# Patient Record
Sex: FEMALE | Race: WHITE | Employment: UNEMPLOYED | URBAN - NONMETROPOLITAN AREA
[De-identification: names, ages, dates, MRNs, and addresses within clinical notes are randomized per-mention and may not be internally consistent; named-entity substitution may affect disease eponyms.]

---

## 2020-09-08 ENCOUNTER — HOSPITAL ENCOUNTER (EMERGENCY)
Age: 40
Discharge: HOME | End: 2020-09-08
Attending: FAMILY MEDICINE
Payer: MEDICARE

## 2020-09-08 ENCOUNTER — APPOINTMENT (OUTPATIENT)
Dept: GENERAL RADIOLOGY | Age: 40
End: 2020-09-08
Payer: MEDICARE

## 2020-09-08 VITALS — WEIGHT: 233.6 LBS | TEMPERATURE: 97.9 F | HEART RATE: 98 BPM | RESPIRATION RATE: 18 BRPM | OXYGEN SATURATION: 96 %

## 2020-09-08 LAB
-: NORMAL
AMORPHOUS: NORMAL
BACTERIA: NORMAL
BILIRUBIN URINE: NEGATIVE
CASTS UA: NORMAL /LPF
COLOR: YELLOW
COMMENT UA: ABNORMAL
CRYSTALS, UA: NORMAL /HPF
EPITHELIAL CELLS UA: NORMAL /HPF
GLUCOSE URINE: NEGATIVE
HCG(URINE) PREGNANCY TEST: NEGATIVE
KETONES, URINE: ABNORMAL
LEUKOCYTE ESTERASE, URINE: NEGATIVE
MUCUS: NORMAL
NITRITE, URINE: NEGATIVE
OTHER OBSERVATIONS UA: NORMAL
PH UA: 5 (ref 5–8)
PROTEIN UA: ABNORMAL
RBC UA: NORMAL /HPF (ref 0–2)
RENAL EPITHELIAL, UA: NORMAL /HPF
SPECIFIC GRAVITY UA: 1.03 (ref 1–1.03)
TRICHOMONAS: NORMAL
TURBIDITY: CLEAR
URINE HGB: ABNORMAL
UROBILINOGEN, URINE: NORMAL
WBC UA: NORMAL /HPF
YEAST: NORMAL

## 2020-09-08 PROCEDURE — 71045 X-RAY EXAM CHEST 1 VIEW: CPT

## 2020-09-08 PROCEDURE — 81001 URINALYSIS AUTO W/SCOPE: CPT

## 2020-09-08 PROCEDURE — U0003 INFECTIOUS AGENT DETECTION BY NUCLEIC ACID (DNA OR RNA); SEVERE ACUTE RESPIRATORY SYNDROME CORONAVIRUS 2 (SARS-COV-2) (CORONAVIRUS DISEASE [COVID-19]), AMPLIFIED PROBE TECHNIQUE, MAKING USE OF HIGH THROUGHPUT TECHNOLOGIES AS DESCRIBED BY CMS-2020-01-R: HCPCS

## 2020-09-08 PROCEDURE — 99283 EMERGENCY DEPT VISIT LOW MDM: CPT

## 2020-09-08 PROCEDURE — 81025 URINE PREGNANCY TEST: CPT

## 2020-09-08 PROCEDURE — 6370000000 HC RX 637 (ALT 250 FOR IP): Performed by: FAMILY MEDICINE

## 2020-09-08 RX ORDER — IBUPROFEN 800 MG/1
800 TABLET ORAL EVERY 8 HOURS PRN
Qty: 30 TABLET | Refills: 1 | Status: SHIPPED | OUTPATIENT
Start: 2020-09-08 | End: 2020-10-25

## 2020-09-08 RX ORDER — AMOXICILLIN AND CLAVULANATE POTASSIUM 875; 125 MG/1; MG/1
1 TABLET, FILM COATED ORAL 2 TIMES DAILY
Qty: 20 TABLET | Refills: 0 | Status: SHIPPED | OUTPATIENT
Start: 2020-09-08 | End: 2020-09-18

## 2020-09-08 RX ORDER — IBUPROFEN 200 MG
200 TABLET ORAL EVERY 6 HOURS PRN
COMMUNITY
End: 2020-10-25 | Stop reason: ALTCHOICE

## 2020-09-08 RX ORDER — AMOXICILLIN AND CLAVULANATE POTASSIUM 875; 125 MG/1; MG/1
1 TABLET, FILM COATED ORAL ONCE
Status: COMPLETED | OUTPATIENT
Start: 2020-09-08 | End: 2020-09-08

## 2020-09-08 RX ORDER — GUAIFENESIN AND DEXTROMETHORPHAN HYDROBROMIDE 1200; 60 MG/1; MG/1
1 TABLET, EXTENDED RELEASE ORAL EVERY 12 HOURS PRN
Qty: 28 TABLET | Refills: 0 | Status: SHIPPED | OUTPATIENT
Start: 2020-09-08 | End: 2020-10-25 | Stop reason: ALTCHOICE

## 2020-09-08 RX ADMIN — AMOXICILLIN AND CLAVULANATE POTASSIUM 1 TABLET: 875; 125 TABLET, FILM COATED ORAL at 23:04

## 2020-09-08 ASSESSMENT — PAIN SCALES - GENERAL: PAINLEVEL_OUTOF10: 5

## 2020-09-09 ENCOUNTER — CARE COORDINATION (OUTPATIENT)
Dept: CARE COORDINATION | Age: 40
End: 2020-09-09

## 2020-09-09 ASSESSMENT — ENCOUNTER SYMPTOMS
COUGH: 1
FACIAL SWELLING: 1

## 2020-09-09 NOTE — CARE COORDINATION
Patient was called to follow up with most recent ER visit. There was no answer. The voicemail box is full.

## 2020-09-09 NOTE — ED PROVIDER NOTES
[address] [facility]  eMERGENCY dEPARTMENT eNCOUnter          [address] [facility]       Chief Complaint   Patient presents with    Fever     patients states having a fever and cough for 7 days    Dental Pain     dental pain for 2 days       Nurses Notes reviewed and I agree except as noted in the HPI. HISTORY OF PRESENT ILLNESS    Pito Hilton is a 37 y.o. female who presents to the emergency room via private vehicle, patient complaining of cough and fever for the past 7 days, some myalgias, also has noted dental pain for the past 2 weeks and much worse over the past couple days, initially on the left side of her face causing swelling the more recent is been in the right lower jaw. Patient rates her overall discomfort 5 out of 10. REVIEW OF SYSTEMS     Review of Systems   Constitutional: Positive for fever. HENT: Positive for dental problem and facial swelling. Respiratory: Positive for cough. All other systems reviewed and are negative. PAST MEDICAL HISTORY    has no past medical history on file. SURGICAL HISTORY      has no past surgical history on file. CURRENT MEDICATIONS       Discharge Medication List as of 9/8/2020 11:02 PM      CONTINUE these medications which have NOT CHANGED    Details   !! ibuprofen (ADVIL;MOTRIN) 200 MG tablet Take 200 mg by mouth every 6 hours as needed for PainHistorical Med       !! - Potential duplicate medications found. Please discuss with provider. ALLERGIES     has No Known Allergies. FAMILY HISTORY     has no family status information on file. family history is not on file. SOCIAL HISTORY      reports that she has been smoking cigarettes. She has been smoking about 2.00 packs per day. She has never used smokeless tobacco.    PHYSICAL EXAM     INITIAL VITALS:  weight is 233 lb 9.6 oz (106 kg). Her oral temperature is 97.9 °F (36.6 °C). Her pulse is 98. Her respiration is 18 and oxygen saturation is 96%. Physical Exam   Constitutional: Patient is oriented to person, place, and time. Patient appears well-developed and well-nourished. Patient is active and cooperative. HENT:   Head: Normocephalic and atraumatic. Head is without contusion. Right Ear: Hearing and external ear normal. No drainage. Left Ear: Hearing and external ear normal. No drainage. Nose: Nose normal. No nasal deformity. No epistaxis. Mouth/Throat: Mucous membranes are not dry. There is numerous decaying teeth, including a cavitated tooth in the right lower jaw, there is no gross abscess, there is subtle swelling greater on the right aspect of the face  Eyes: EOMI. Conjunctivae, sclera, and lids are normal. Right eye exhibits no discharge. Left eye exhibits no discharge. Neck: Full passive range of motion without pain and phonation normal.   Cardiovascular:  Normal rate, regular rhythm and intact distal pulses. Pulses: Right radial pulse  2+   Pulmonary/Chest: Effort normal. No tachypnea and no bradypnea. Fine central coarseness without wheezing rhonchi or rales  Abdominal: Increased BMI  Musculoskeletal:   Negative acute trauma or deformity,  apparent full range of motion and normal strength all extremities appropriate to age. Neurological: Patient is alert and oriented to person, place, and time. patient displays no tremor. Patient displays no seizure activity. .  Lymphatic: No cervical lymphadenopathy  Skin: Skin is warm and dry. Patient is not diaphoretic. Psychiatric: Patient has a normal mood and affect. Patient speech is normal and behavior is normal. Cognition and memory are normal.    DIFFERENTIAL DIAGNOSIS:   Pneumonia bronchitis URI, viral illness NOS, dental infection    DIAGNOSTIC RESULTS           RADIOLOGY: non-plain film images(s) such as CT, Ultrasound and MRI are read by the radiologist.  XR CHEST PORTABLE   Final Result      No focal consolidation, pneumothorax or pleural effusion.       Bilateral peribronchial thickening is noted as can be seen with    bronchitis/bronchiolitis, viral pneumonitis or reactive airways disease. LABS:   Labs Reviewed   URINALYSIS - Abnormal; Notable for the following components:       Result Value    Ketones, Urine TRACE (*)     Urine Hgb TRACE (*)     Protein, UA 1+ (*)     All other components within normal limits   PREGNANCY, URINE   MICROSCOPIC URINALYSIS   COVID-19       EMERGENCY DEPARTMENT COURSE:   Vitals:    Vitals:    09/08/20 2150   Pulse: 98   Resp: 18   Temp: 97.9 °F (36.6 °C)   TempSrc: Oral   SpO2: 96%   Weight: 233 lb 9.6 oz (106 kg)     Patient history and physical exam taken at bedside, discussed patient symptoms and exam findings, discussed initial work-up to include chest x-ray, oral swab, patient later states she has concern for possible pregnancy and urinary tract infection, urine studies. Explained to patient at this time would like to hold on blood work until imaging reviewed, acknowledged. Patient sitting in bed semi-Fowlers    Imaging reviewed    Urine studies reviewed    Discussed with patient her overall work-up, discussed diagnosis bronchitis, explained this is most often viral disease runs its course though OTC cough cold medication such as Mucinex DM may give her some relief, discussed patient's dental cavities likely causing possible infection facial swelling, confirmed allergies, will initiate patient on Augmentin twice daily for 10 days with first dose in the emergency room, Motrin/Tylenol for pain, need for follow-up with dentist/oral surgeon, patient knowledges    FINAL IMPRESSION      1. Bronchitis    2. Facial swelling    3.  Pain, dental          DISPOSITION/PLAN   D/c    PATIENT REFERRED TO:  Follow-up with dentist/oral surgeon    Schedule an appointment as soon as possible for a visit       Keith   [address] [facility]  [phone]  Call         DISCHARGE MEDICATIONS:  Discharge Medication List as of 9/8/2020 11:02 PM      START taking these medications    Details   amoxicillin-clavulanate (AUGMENTIN) 875-125 MG per tablet Take 1 tablet by mouth 2 times daily for 10 days, Disp-20 tablet,R-0Print      !! ibuprofen (ADVIL;MOTRIN) 800 MG tablet Take 1 tablet by mouth every 8 hours as needed for Pain, Disp-30 tablet,R-1Print      Dextromethorphan-guaiFENesin  MG TB12 Take 1 tablet by mouth every 12 hours as needed (cough, congestion), Disp-28 tablet,R-0Print       !! - Potential duplicate medications found. Please discuss with provider. Summation      Patient Course:  D/c    ED Medications administered this visit:    Medications   amoxicillin-clavulanate (AUGMENTIN) 875-125 MG per tablet 1 tablet (1 tablet Oral Given 9/8/20 2304)       New Prescriptions from this visit:    Discharge Medication List as of 9/8/2020 11:02 PM      START taking these medications    Details   amoxicillin-clavulanate (AUGMENTIN) 875-125 MG per tablet Take 1 tablet by mouth 2 times daily for 10 days, Disp-20 tablet,R-0Print      !! ibuprofen (ADVIL;MOTRIN) 800 MG tablet Take 1 tablet by mouth every 8 hours as needed for Pain, Disp-30 tablet,R-1Print      Dextromethorphan-guaiFENesin  MG TB12 Take 1 tablet by mouth every 12 hours as needed (cough, congestion), Disp-28 tablet,R-0Print       !! - Potential duplicate medications found. Please discuss with provider. Follow-up:  Follow-up with dentist/oral surgeon    Schedule an appointment as soon as possible for a visit       Keith [address]  [address] [facility]  Call           Final Impression:   1. Bronchitis    2. Facial swelling    3.  Pain, dental               (Please note that portions of this note were completed with a voice recognition program.  Efforts were made to edit the dictations but occasionally words are mis-transcribed.)    MD Shane Means MD  09/09/20 7706

## 2020-09-11 LAB — SARS-COV-2, NAA: NOT DETECTED

## 2020-10-25 ENCOUNTER — HOSPITAL ENCOUNTER (EMERGENCY)
Age: 40
Discharge: HOME | End: 2020-10-25
Attending: FAMILY MEDICINE
Payer: MEDICARE

## 2020-10-25 ENCOUNTER — APPOINTMENT (OUTPATIENT)
Dept: GENERAL RADIOLOGY | Age: 40
End: 2020-10-25
Payer: MEDICARE

## 2020-10-25 VITALS
DIASTOLIC BLOOD PRESSURE: 78 MMHG | HEART RATE: 97 BPM | WEIGHT: 241.9 LBS | OXYGEN SATURATION: 95 % | RESPIRATION RATE: 18 BRPM | BODY MASS INDEX: 38.87 KG/M2 | SYSTOLIC BLOOD PRESSURE: 123 MMHG | HEIGHT: 66 IN | TEMPERATURE: 98.9 F

## 2020-10-25 LAB
ABSOLUTE EOS #: 0.1 K/UL (ref 0–0.4)
ABSOLUTE IMMATURE GRANULOCYTE: ABNORMAL K/UL (ref 0–0.3)
ABSOLUTE LYMPH #: 2 K/UL (ref 1–4.8)
ABSOLUTE MONO #: 0.8 K/UL (ref 0–1)
ANION GAP SERPL CALCULATED.3IONS-SCNC: 7 MMOL/L (ref 9–17)
BASOPHILS # BLD: 0 % (ref 0–2)
BASOPHILS ABSOLUTE: 0 K/UL (ref 0–0.2)
BUN BLDV-MCNC: 12 MG/DL (ref 6–20)
BUN/CREAT BLD: 24 (ref 9–20)
CALCIUM SERPL-MCNC: 9 MG/DL (ref 8.6–10.4)
CHLORIDE BLD-SCNC: 102 MMOL/L (ref 98–107)
CO2: 28 MMOL/L (ref 20–31)
CREAT SERPL-MCNC: 0.51 MG/DL (ref 0.5–0.9)
D-DIMER QUANTITATIVE: 0.35 MG/L FEU (ref 0–0.59)
DIFFERENTIAL TYPE: YES
EOSINOPHILS RELATIVE PERCENT: 1 % (ref 0–5)
GFR AFRICAN AMERICAN: >60 ML/MIN
GFR NON-AFRICAN AMERICAN: >60 ML/MIN
GFR SERPL CREATININE-BSD FRML MDRD: ABNORMAL ML/MIN/{1.73_M2}
GFR SERPL CREATININE-BSD FRML MDRD: ABNORMAL ML/MIN/{1.73_M2}
GLUCOSE BLD-MCNC: 143 MG/DL (ref 70–99)
HCG QUALITATIVE: NEGATIVE
HCT VFR BLD CALC: 39.5 % (ref 36–46)
HEMOGLOBIN: 13.1 G/DL (ref 12–16)
IMMATURE GRANULOCYTES: ABNORMAL %
LYMPHOCYTES # BLD: 15 % (ref 15–40)
MCH RBC QN AUTO: 30.7 PG (ref 26–34)
MCHC RBC AUTO-ENTMCNC: 33.2 G/DL (ref 31–37)
MCV RBC AUTO: 92.6 FL (ref 80–100)
MONOCYTES # BLD: 6 % (ref 4–8)
NRBC AUTOMATED: ABNORMAL PER 100 WBC
PDW BLD-RTO: 14.7 % (ref 12.1–15.2)
PLATELET # BLD: 238 K/UL (ref 140–450)
PLATELET ESTIMATE: ABNORMAL
PMV BLD AUTO: ABNORMAL FL (ref 6–12)
POTASSIUM SERPL-SCNC: 3.8 MMOL/L (ref 3.7–5.3)
RBC # BLD: 4.27 M/UL (ref 4–5.2)
RBC # BLD: ABNORMAL 10*6/UL
SARS-COV-2, RAPID: NOT DETECTED
SARS-COV-2: NORMAL
SARS-COV-2: NORMAL
SEG NEUTROPHILS: 78 % (ref 47–75)
SEGMENTED NEUTROPHILS ABSOLUTE COUNT: 10.3 K/UL (ref 2.5–7)
SODIUM BLD-SCNC: 137 MMOL/L (ref 135–144)
SOURCE: NORMAL
TROPONIN INTERP: NORMAL
TROPONIN INTERP: NORMAL
TROPONIN T: <0.03 NG/ML
TROPONIN T: <0.03 NG/ML
TROPONIN, HIGH SENSITIVITY: NORMAL NG/L (ref 0–14)
TROPONIN, HIGH SENSITIVITY: NORMAL NG/L (ref 0–14)
WBC # BLD: 13.3 K/UL (ref 3.5–11)
WBC # BLD: ABNORMAL 10*3/UL

## 2020-10-25 PROCEDURE — 6370000000 HC RX 637 (ALT 250 FOR IP): Performed by: FAMILY MEDICINE

## 2020-10-25 PROCEDURE — 36415 COLL VENOUS BLD VENIPUNCTURE: CPT

## 2020-10-25 PROCEDURE — 85379 FIBRIN DEGRADATION QUANT: CPT

## 2020-10-25 PROCEDURE — 84703 CHORIONIC GONADOTROPIN ASSAY: CPT

## 2020-10-25 PROCEDURE — 73630 X-RAY EXAM OF FOOT: CPT

## 2020-10-25 PROCEDURE — U0002 COVID-19 LAB TEST NON-CDC: HCPCS

## 2020-10-25 PROCEDURE — 93005 ELECTROCARDIOGRAM TRACING: CPT | Performed by: FAMILY MEDICINE

## 2020-10-25 PROCEDURE — 71045 X-RAY EXAM CHEST 1 VIEW: CPT

## 2020-10-25 PROCEDURE — 85025 COMPLETE CBC W/AUTO DIFF WBC: CPT

## 2020-10-25 PROCEDURE — 84484 ASSAY OF TROPONIN QUANT: CPT

## 2020-10-25 PROCEDURE — 80048 BASIC METABOLIC PNL TOTAL CA: CPT

## 2020-10-25 PROCEDURE — 99284 EMERGENCY DEPT VISIT MOD MDM: CPT

## 2020-10-25 RX ORDER — DOXYCYCLINE HYCLATE 100 MG
100 TABLET ORAL ONCE
Status: COMPLETED | OUTPATIENT
Start: 2020-10-25 | End: 2020-10-25

## 2020-10-25 RX ORDER — ASPIRIN 81 MG/1
324 TABLET, CHEWABLE ORAL ONCE
Status: COMPLETED | OUTPATIENT
Start: 2020-10-25 | End: 2020-10-25

## 2020-10-25 RX ORDER — DOXYCYCLINE HYCLATE 100 MG
100 TABLET ORAL 2 TIMES DAILY
Qty: 20 TABLET | Refills: 0 | Status: SHIPPED | OUTPATIENT
Start: 2020-10-25 | End: 2020-10-27

## 2020-10-25 RX ADMIN — ASPIRIN 324 MG: 81 TABLET, CHEWABLE ORAL at 18:48

## 2020-10-25 RX ADMIN — DOXYCYCLINE HYCLATE 100 MG: 100 TABLET, COATED ORAL at 21:40

## 2020-10-25 ASSESSMENT — PAIN DESCRIPTION - LOCATION: LOCATION: CHEST

## 2020-10-25 ASSESSMENT — PAIN SCALES - GENERAL: PAINLEVEL_OUTOF10: 6

## 2020-10-25 ASSESSMENT — PAIN DESCRIPTION - PAIN TYPE: TYPE: ACUTE PAIN

## 2020-10-26 LAB
EKG ATRIAL RATE: 108 BPM
EKG ATRIAL RATE: 99 BPM
EKG P AXIS: 49 DEGREES
EKG P AXIS: 69 DEGREES
EKG P-R INTERVAL: 150 MS
EKG P-R INTERVAL: 158 MS
EKG Q-T INTERVAL: 350 MS
EKG Q-T INTERVAL: 364 MS
EKG QRS DURATION: 80 MS
EKG QRS DURATION: 84 MS
EKG QTC CALCULATION (BAZETT): 467 MS
EKG QTC CALCULATION (BAZETT): 469 MS
EKG R AXIS: 39 DEGREES
EKG R AXIS: 63 DEGREES
EKG T AXIS: 33 DEGREES
EKG T AXIS: 60 DEGREES
EKG VENTRICULAR RATE: 108 BPM
EKG VENTRICULAR RATE: 99 BPM

## 2020-10-26 PROCEDURE — 93010 ELECTROCARDIOGRAM REPORT: CPT | Performed by: INTERNAL MEDICINE

## 2020-10-26 ASSESSMENT — ENCOUNTER SYMPTOMS
SHORTNESS OF BREATH: 1
COUGH: 1

## 2020-10-26 ASSESSMENT — HEART SCORE: ECG: 0

## 2020-10-26 NOTE — ED PROVIDER NOTES
[address] [facility]  eMERGENCY dEPARTMENT eNCOUnter          [address] [facility]       Chief Complaint   Patient presents with    Chest Pain     Pt had severe chest pain yesterday and is extremely SOB with exertion     Cough    Shortness of Breath       Nurses Notes reviewed and I agree except as noted in the HPI. HISTORY OF PRESENT ILLNESS    Marya Tovar is a 37 y.o. female who presents to the emergency room via private vehicle, patient complaining of 1 day of \"really bad chest pain\", and feels that she could not breathe, states she has had fever cough and shortness of breath. Patient states she was told she had an abnormal heart work-up at [facility] however did not complete her follow-up at that facility regarding this. Patient dates she did some Tylenol 3 hours prior to arrival.  Patient also complained of some right foot pain, states she is a history of 2 surgeries in the foot and did have a broken bone once without even knowing it, was concerned for another break, denies any trauma or falls. Patient rates her overall discomfort 6/10, indicating her chest.  Patient denies any history of blood clots or known family history of blood clots, patient is a smoker. REVIEW OF SYSTEMS     Review of Systems   Constitutional: Positive for fever. Respiratory: Positive for cough and shortness of breath. All other systems reviewed and are negative. PAST MEDICAL HISTORY    has a past medical history of Endometriosis. SURGICAL HISTORY      has a past surgical history that includes Foot surgery (Right);  section; and Dilation and curettage of uterus. CURRENT MEDICATIONS       Discharge Medication List as of 10/25/2020  9:33 PM          ALLERGIES     has No Known Allergies. FAMILY HISTORY     has no family status information on file. family history is not on file. SOCIAL HISTORY      reports that she has been smoking cigarettes.  She has been smoking about 2.00 packs per day. She has never used smokeless tobacco. She reports previous alcohol use. She reports previous drug use. PHYSICAL EXAM     INITIAL VITALS:  height is 5' 6\" (1.676 m) and weight is 241 lb 14.4 oz (109.7 kg). Her oral temperature is 98.9 °F (37.2 °C). Her blood pressure is 123/78 and her pulse is 97. Her respiration is 18 and oxygen saturation is 95%. Physical Exam   Constitutional: Patient is oriented to person, place, and time. Patient appears well-developed and well-nourished. Patient is active and cooperative. HENT:   Head: Normocephalic and atraumatic. Head is without contusion. Right Ear: Hearing and external ear normal. No drainage. Left Ear: Hearing and external ear normal. No drainage. Nose: Nose normal. No nasal deformity. No epistaxis. Mouth/Throat: Mucous membranes are not dry. Eyes: EOMI. Conjunctivae, sclera, and lids are normal. Right eye exhibits no discharge. Left eye exhibits no discharge. Neck: Full passive range of motion without pain and phonation normal.   Cardiovascular:  Normal rate, regular rhythm and intact distal pulses. No gross lower extremity edema. Pulses: Right radial pulse  2+   Pulmonary/Chest: Effort normal. No tachypnea and no bradypnea. No wheezes, rhonchi, or rales. Abdominal: BMI 39, soft, nondistended  Musculoskeletal:   Focused examination of patient's right foot shows a well-healed surgical scar over the medial aspect, there is no acute trauma or deformity otherwise appreciated, no overlying integument aberration otherwise noted. Except as otherwise noted, negative acute trauma or deformity,  apparent full range of motion and normal strength all extremities appropriate to age. Neurological: Patient is alert and oriented to person, place, and time. patient displays no tremor. Patient displays no seizure activity. .    Skin: Skin is warm and dry. Patient is not diaphoretic. Psychiatric: Patient has a normal mood and affect.  Patient speech is normal and behavior is normal. Cognition and memory are normal.    DIFFERENTIAL DIAGNOSIS:   PNA bronchitis URI, viral illness NOS, PE, fracture dislocation    DIAGNOSTIC RESULTS     EKG: All EKG's are interpreted by the Emergency Department Physician who either signs or Co-signs this chart in the absence of a cardiologist.  EKG    The patient had an EKG which is interpreted by me in the absence of a Cardiologist.   [x] Without comparison to previous. [] With comparison to a previous EKG Dated     EKG @ 1849 hrs -sinus rhythm rate 108, normal axis normal intervals, no prior for comparison    EKG @ 2058 hrs -sinus rhythm rate 99 normal axis normal intervals    RADIOLOGY: non-plain film images(s) such as CT, Ultrasound and MRI are read by the radiologist.  XR CHEST PORTABLE   Final Result      Bibasilar atelectasis or subtle infiltrate with bronchial wall thickening,    right greater than left. No consolidation or effusion. No pneumothorax. Mild cardiomegaly is again suggested. XR FOOT RIGHT (MIN 3 VIEWS)   Final Result      No acute osseous variation is seen.       2                LABS:   Labs Reviewed   CBC WITH AUTO DIFFERENTIAL - Abnormal; Notable for the following components:       Result Value    WBC 13.3 (*)     Seg Neutrophils 78 (*)     Segs Absolute 10.30 (*)     All other components within normal limits   BASIC METABOLIC PANEL W/ REFLEX TO MG FOR LOW K - Abnormal; Notable for the following components:    Glucose 143 (*)     Bun/Cre Ratio 24 (*)     Anion Gap 7 (*)     All other components within normal limits   TROPONIN   D-DIMER, QUANTITATIVE   HCG, SERUM, QUALITATIVE   COVID-19   TROPONIN       EMERGENCY DEPARTMENT COURSE:   Vitals:    Vitals:    10/25/20 1835 10/25/20 1838 10/25/20 2126   BP: (!) 159/81 (!) 159/81 123/78   Pulse: 121  97   Resp: 18  18   Temp: 98.9 °F (37.2 °C)     TempSrc: Oral     SpO2: 99% 97% 95%   Weight: 241 lb 14.4 oz (109.7 kg)     Height: 5' 6\" (1.676 m) Patient history and physical exam taken at bedside, discussed patient symptoms and exam findings, discussed initial plan work-up to include EKG, chest x-ray, blood work, IV access, chewable aspirin. Patient resting bed semi-Fowlers, acknowledges    Patient complaining of right foot pain, x-ray right foot added. EKG and chest x-ray as above    Initial lab work-up reviewed    Initial order for outpatient Covid swab was sent, however it was done as a rapid test, Covid negative    Discussed with patient my concern for possible early pneumonia, however with patient's complaint of chest pain and some risk factors I would like to get a 2-hour troponin and reevaluate, patient resting high semi-Fowlers in bed, acknowledges    Second troponin negative, second EKG as above    Discussed with patient overall work-up, confirmed allergies, will initiate patient on doxycycline with first dose in the emergency room and prescription same, Motrin/Tylenol for any fevers or aches, will refer to primary care for follow-up, return to ER if any symptoms change worsen or other concerns, patient acknowledges    Strongly encourage patient to consider is to stop smoking    FINAL IMPRESSION      1.  Pneumonia due to organism          DISPOSITION/PLAN   D/c    PATIENT REFERRED TO:  Keith   [address] [place] [address]          DISCHARGE MEDICATIONS:  Discharge Medication List as of 10/25/2020  9:33 PM      START taking these medications    Details   doxycycline hyclate (VIBRA-TABS) 100 MG tablet Take 1 tablet by mouth 2 times daily for 10 days, Disp-20 tablet,R-0Normal                 Summation      Patient Course:  D/c    ED Medications administered this visit:    Medications   aspirin chewable tablet 324 mg (324 mg Oral Given 10/25/20 8773)   doxycycline hyclate (VIBRA-TABS) tablet 100 mg (100 mg Oral Given 10/25/20 2140)       New Prescriptions from this visit:    Discharge Medication List as of 10/25/2020  9:33 PM      START taking these medications    Details   doxycycline hyclate (VIBRA-TABS) 100 MG tablet Take 1 tablet by mouth 2 times daily for 10 days, Disp-20 tablet,R-0Normal             Follow-up:  Keith 59  [address] [facility]            Final Impression:   1.  Pneumonia due to organism               (Please note that portions of this note were completed with a voice recognition program.  Efforts were made to edit the dictations but occasionally words are mis-transcribed.)    MD Elkin Ledezma MD  10/26/20 0748

## 2020-10-27 ENCOUNTER — HOSPITAL ENCOUNTER (EMERGENCY)
Age: 40
Discharge: HOME | End: 2020-10-27
Attending: FAMILY MEDICINE
Payer: MEDICARE

## 2020-10-27 ENCOUNTER — APPOINTMENT (OUTPATIENT)
Dept: GENERAL RADIOLOGY | Age: 40
End: 2020-10-27
Payer: MEDICARE

## 2020-10-27 VITALS
OXYGEN SATURATION: 97 % | DIASTOLIC BLOOD PRESSURE: 86 MMHG | WEIGHT: 240 LBS | HEIGHT: 66 IN | SYSTOLIC BLOOD PRESSURE: 120 MMHG | HEART RATE: 102 BPM | TEMPERATURE: 98.2 F | BODY MASS INDEX: 38.57 KG/M2 | RESPIRATION RATE: 16 BRPM

## 2020-10-27 LAB
ABSOLUTE EOS #: 0.1 K/UL (ref 0–0.4)
ABSOLUTE IMMATURE GRANULOCYTE: ABNORMAL K/UL (ref 0–0.3)
ABSOLUTE LYMPH #: 1.5 K/UL (ref 1–4.8)
ABSOLUTE MONO #: 0.7 K/UL (ref 0–1)
ANION GAP SERPL CALCULATED.3IONS-SCNC: 8 MMOL/L (ref 9–17)
BASOPHILS # BLD: 0 % (ref 0–2)
BASOPHILS ABSOLUTE: 0 K/UL (ref 0–0.2)
BUN BLDV-MCNC: 10 MG/DL (ref 6–20)
BUN/CREAT BLD: 21 (ref 9–20)
CALCIUM SERPL-MCNC: 8.7 MG/DL (ref 8.6–10.4)
CHLORIDE BLD-SCNC: 103 MMOL/L (ref 98–107)
CO2: 26 MMOL/L (ref 20–31)
CREAT SERPL-MCNC: 0.47 MG/DL (ref 0.5–0.9)
D-DIMER QUANTITATIVE: 0.33 MG/L FEU (ref 0–0.59)
DIFFERENTIAL TYPE: YES
EOSINOPHILS RELATIVE PERCENT: 1 % (ref 0–5)
GFR AFRICAN AMERICAN: >60 ML/MIN
GFR NON-AFRICAN AMERICAN: >60 ML/MIN
GFR SERPL CREATININE-BSD FRML MDRD: ABNORMAL ML/MIN/{1.73_M2}
GFR SERPL CREATININE-BSD FRML MDRD: ABNORMAL ML/MIN/{1.73_M2}
GLUCOSE BLD-MCNC: 143 MG/DL (ref 70–99)
HCT VFR BLD CALC: 37.6 % (ref 36–46)
HEMOGLOBIN: 12.6 G/DL (ref 12–16)
IMMATURE GRANULOCYTES: ABNORMAL %
LYMPHOCYTES # BLD: 19 % (ref 15–40)
MCH RBC QN AUTO: 30.8 PG (ref 26–34)
MCHC RBC AUTO-ENTMCNC: 33.5 G/DL (ref 31–37)
MCV RBC AUTO: 91.9 FL (ref 80–100)
MONOCYTES # BLD: 9 % (ref 4–8)
NRBC AUTOMATED: ABNORMAL PER 100 WBC
PDW BLD-RTO: 14.4 % (ref 12.1–15.2)
PLATELET # BLD: 209 K/UL (ref 140–450)
PLATELET ESTIMATE: ABNORMAL
PMV BLD AUTO: ABNORMAL FL (ref 6–12)
POTASSIUM SERPL-SCNC: 3.8 MMOL/L (ref 3.7–5.3)
RBC # BLD: 4.09 M/UL (ref 4–5.2)
RBC # BLD: ABNORMAL 10*6/UL
SEG NEUTROPHILS: 71 % (ref 47–75)
SEGMENTED NEUTROPHILS ABSOLUTE COUNT: 5.9 K/UL (ref 2.5–7)
SODIUM BLD-SCNC: 137 MMOL/L (ref 135–144)
WBC # BLD: 8.4 K/UL (ref 3.5–11)
WBC # BLD: ABNORMAL 10*3/UL

## 2020-10-27 PROCEDURE — 99285 EMERGENCY DEPT VISIT HI MDM: CPT

## 2020-10-27 PROCEDURE — 85025 COMPLETE CBC W/AUTO DIFF WBC: CPT

## 2020-10-27 PROCEDURE — 94664 DEMO&/EVAL PT USE INHALER: CPT

## 2020-10-27 PROCEDURE — 80048 BASIC METABOLIC PNL TOTAL CA: CPT

## 2020-10-27 PROCEDURE — 96375 TX/PRO/DX INJ NEW DRUG ADDON: CPT

## 2020-10-27 PROCEDURE — 96374 THER/PROPH/DIAG INJ IV PUSH: CPT

## 2020-10-27 PROCEDURE — 6370000000 HC RX 637 (ALT 250 FOR IP): Performed by: FAMILY MEDICINE

## 2020-10-27 PROCEDURE — 6360000002 HC RX W HCPCS: Performed by: FAMILY MEDICINE

## 2020-10-27 PROCEDURE — 71045 X-RAY EXAM CHEST 1 VIEW: CPT

## 2020-10-27 PROCEDURE — 93005 ELECTROCARDIOGRAM TRACING: CPT | Performed by: FAMILY MEDICINE

## 2020-10-27 PROCEDURE — 85379 FIBRIN DEGRADATION QUANT: CPT

## 2020-10-27 RX ORDER — IPRATROPIUM BROMIDE AND ALBUTEROL SULFATE 2.5; .5 MG/3ML; MG/3ML
1 SOLUTION RESPIRATORY (INHALATION) ONCE
Status: COMPLETED | OUTPATIENT
Start: 2020-10-27 | End: 2020-10-27

## 2020-10-27 RX ORDER — KETOROLAC TROMETHAMINE 30 MG/ML
30 INJECTION, SOLUTION INTRAMUSCULAR; INTRAVENOUS ONCE
Status: COMPLETED | OUTPATIENT
Start: 2020-10-27 | End: 2020-10-27

## 2020-10-27 RX ORDER — ALBUTEROL SULFATE 90 UG/1
2 AEROSOL, METERED RESPIRATORY (INHALATION) EVERY 6 HOURS PRN
Qty: 1 INHALER | Refills: 0 | Status: SHIPPED | OUTPATIENT
Start: 2020-10-27

## 2020-10-27 RX ORDER — DOXYCYCLINE HYCLATE 100 MG
100 TABLET ORAL 2 TIMES DAILY
Qty: 20 TABLET | Refills: 0 | Status: SHIPPED | OUTPATIENT
Start: 2020-10-27 | End: 2020-11-06

## 2020-10-27 RX ORDER — ALBUTEROL SULFATE 90 UG/1
2 AEROSOL, METERED RESPIRATORY (INHALATION) EVERY 6 HOURS PRN
Status: DISCONTINUED | OUTPATIENT
Start: 2020-10-27 | End: 2020-10-27 | Stop reason: HOSPADM

## 2020-10-27 RX ORDER — ALBUTEROL SULFATE 90 UG/1
AEROSOL, METERED RESPIRATORY (INHALATION)
Status: DISCONTINUED
Start: 2020-10-27 | End: 2020-10-27 | Stop reason: HOSPADM

## 2020-10-27 RX ORDER — METHYLPREDNISOLONE SODIUM SUCCINATE 125 MG/2ML
125 INJECTION, POWDER, LYOPHILIZED, FOR SOLUTION INTRAMUSCULAR; INTRAVENOUS ONCE
Status: COMPLETED | OUTPATIENT
Start: 2020-10-27 | End: 2020-10-27

## 2020-10-27 RX ORDER — PREDNISONE 20 MG/1
60 TABLET ORAL DAILY
Qty: 15 TABLET | Refills: 0 | Status: SHIPPED | OUTPATIENT
Start: 2020-10-27 | End: 2020-11-01

## 2020-10-27 RX ORDER — DOXYCYCLINE HYCLATE 100 MG
100 TABLET ORAL ONCE
Status: COMPLETED | OUTPATIENT
Start: 2020-10-27 | End: 2020-10-27

## 2020-10-27 RX ADMIN — IPRATROPIUM BROMIDE AND ALBUTEROL SULFATE 1 AMPULE: .5; 3 SOLUTION RESPIRATORY (INHALATION) at 20:29

## 2020-10-27 RX ADMIN — METHYLPREDNISOLONE SODIUM SUCCINATE 125 MG: 125 INJECTION, POWDER, FOR SOLUTION INTRAMUSCULAR; INTRAVENOUS at 20:12

## 2020-10-27 RX ADMIN — KETOROLAC TROMETHAMINE 30 MG: 30 INJECTION, SOLUTION INTRAMUSCULAR; INTRAVENOUS at 20:45

## 2020-10-27 RX ADMIN — DOXYCYCLINE HYCLATE 100 MG: 100 TABLET, COATED ORAL at 21:14

## 2020-10-27 ASSESSMENT — PAIN DESCRIPTION - FREQUENCY: FREQUENCY: CONTINUOUS

## 2020-10-27 ASSESSMENT — PAIN DESCRIPTION - ORIENTATION: ORIENTATION: RIGHT

## 2020-10-27 ASSESSMENT — PAIN DESCRIPTION - PAIN TYPE: TYPE: ACUTE PAIN

## 2020-10-27 ASSESSMENT — PAIN SCALES - GENERAL
PAINLEVEL_OUTOF10: 6
PAINLEVEL_OUTOF10: 7
PAINLEVEL_OUTOF10: 7

## 2020-10-27 ASSESSMENT — PAIN DESCRIPTION - DESCRIPTORS: DESCRIPTORS: TIGHTNESS

## 2020-10-27 ASSESSMENT — PAIN DESCRIPTION - LOCATION: LOCATION: CHEST

## 2020-10-28 LAB
EKG ATRIAL RATE: 92 BPM
EKG P AXIS: 45 DEGREES
EKG P-R INTERVAL: 162 MS
EKG Q-T INTERVAL: 376 MS
EKG QRS DURATION: 82 MS
EKG QTC CALCULATION (BAZETT): 464 MS
EKG R AXIS: 22 DEGREES
EKG T AXIS: 26 DEGREES
EKG VENTRICULAR RATE: 92 BPM

## 2020-10-28 PROCEDURE — 93010 ELECTROCARDIOGRAM REPORT: CPT | Performed by: INTERNAL MEDICINE

## 2020-10-28 ASSESSMENT — ENCOUNTER SYMPTOMS
VOMITING: 0
CHEST TIGHTNESS: 1
SHORTNESS OF BREATH: 1

## 2020-10-28 NOTE — ED NOTES
Writer gives pt her paperwork and gives her the phone number for  so she can contact them for help with her prescriptions.      Enrique Garnica RN  10/27/20 9121

## 2020-10-28 NOTE — ED PROVIDER NOTES
[address] [facility]  eMERGENCY dEPARTMENT eNCOUnter          [address] [facility]       Chief Complaint   Patient presents with    Respiratory Distress     Patient arrives to ER tonight by [facility] with complaints of difficulty breathing. Patient was seen here yesterday and was diagnosed with pneumonia but was unable to get her script filled. Nurses Notes reviewed and I agree except as noted in the HPI. HISTORY OF PRESENT ILLNESS    Maria Alejandra Roblero is a 37 y.o. female who presents to the emergency room via EMS from home with complaints of shortness of breath/difficulty breathing, patient states was diagnosed with pneumonia 2 days ago, but states she was unable to get her prescription filled due to cost, I will not bill for them for a few days. Patient states she is in route to the gas station on foot when she fell she has become too short of breath so called for EMS. Patient is a smoker, states 6 months ago she was tested for potential COPD but never followed up with pulmonology for her results. Patient denies any cardiac history or known history of blood clots, or family history same. Patient rates her chest discomfort 7-10, point at the right side of her chest, describing a tight continuous sensation. REVIEW OF SYSTEMS     Review of Systems   Constitutional: Negative for fever. Respiratory: Positive for chest tightness and shortness of breath. Gastrointestinal: Negative for vomiting. All other systems reviewed and are negative. PAST MEDICAL HISTORY    has a past medical history of Endometriosis and Pneumonia. SURGICAL HISTORY      has a past surgical history that includes Foot surgery (Right);  section; and Dilation and curettage of uterus. CURRENT MEDICATIONS       Discharge Medication List as of 10/27/2020  9:11 PM          ALLERGIES     has No Known Allergies. FAMILY HISTORY     has no family status information on file.     family history is not on semi-Fowlers in bed. RN Christopher Man advises that patient has told her that she has not yet started her antibiotics    EKG as above    Initial lab work-up reviewed    Patient is requesting something for pain for her back, allergies reviewed, ketorolac 30 mg IV ordered    Discussed with patient overall work-up, discussed again concern for pneumonia, as well as patient's potential for having underlying COPD due to her smoking, discussed importance of antibiotic as prescribed, will give dose the emergency room, and will try to get patient under [facility] care for remaining medications. Is there is some question of underlying respiratory illness that is chronic, will give patient 5-day course of steroids and will try to send home with albuterol inhaler from the emergency room with prescription same. I would strongly advise patient to stop smoking, will refer to primary care for follow-up, return to ER if symptoms change worse other concerns, patient acknowledges    FINAL IMPRESSION      1.  Pneumonia due to infectious organism, unspecified laterality, unspecified part of lung          DISPOSITION/PLAN   Discharge    PATIENT REFERRED TO:  Keith 59  [address] [facility]  Call       HOSP [facility] ED  [address] [address]  [phone]    As needed, If symptoms worsen      DISCHARGE MEDICATIONS:  Discharge Medication List as of 10/27/2020  9:11 PM      START taking these medications    Details   predniSONE (DELTASONE) 20 MG tablet Take 3 tablets by mouth daily for 5 days, Disp-15 tablet,R-0Print      albuterol sulfate  (90 Base) MCG/ACT inhaler Inhale 2 puffs into the lungs every 6 hours as needed for Wheezing or Shortness of Breath, Disp-1 Inhaler,R-0Print                 Summation      Patient Course: Discharge    ED Medications administered this visit:    Medications   ipratropium-albuterol (DUONEB) nebulizer solution 1 ampule (1 ampule Inhalation Given 10/27/20 2029)   methylPREDNISolone sodium (SOLU-MEDROL) injection 125 mg (125 mg Intravenous Given 10/27/20 2012)   ketorolac (TORADOL) injection 30 mg (30 mg Intravenous Given 10/27/20 2045)   doxycycline hyclate (VIBRA-TABS) tablet 100 mg (100 mg Oral Given 10/27/20 2114)       New Prescriptions from this visit:    Discharge Medication List as of 10/27/2020  9:11 PM      START taking these medications    Details   predniSONE (DELTASONE) 20 MG tablet Take 3 tablets by mouth daily for 5 days, Disp-15 tablet,R-0Print      albuterol sulfate  (90 Base) MCG/ACT inhaler Inhale 2 puffs into the lungs every 6 hours as needed for Wheezing or Shortness of Breath, Disp-1 Inhaler,R-0Print             Follow-up:  Keith 59  [address] [address]  [phone]  Call       HOSP [facility] ED  [address] [address]  [phone]    As needed, If symptoms worsen        Final Impression:   1.  Pneumonia due to infectious organism, unspecified laterality, unspecified part of lung               (Please note that portions of this note were completed with a voice recognition program.  Efforts were made to edit the dictations but occasionally words are mis-transcribed.)    MD Devon Parrish MD  10/28/20 8976

## 2020-12-16 ENCOUNTER — HOSPITAL ENCOUNTER (EMERGENCY)
Age: 40
Discharge: HOME | End: 2020-12-16
Attending: EMERGENCY MEDICINE
Payer: MEDICARE

## 2020-12-16 VITALS
OXYGEN SATURATION: 94 % | RESPIRATION RATE: 17 BRPM | WEIGHT: 240 LBS | HEIGHT: 66 IN | SYSTOLIC BLOOD PRESSURE: 145 MMHG | BODY MASS INDEX: 38.57 KG/M2 | DIASTOLIC BLOOD PRESSURE: 101 MMHG | TEMPERATURE: 97.8 F | HEART RATE: 86 BPM

## 2020-12-16 PROCEDURE — 99283 EMERGENCY DEPT VISIT LOW MDM: CPT

## 2020-12-16 RX ORDER — KETOCONAZOLE 20 MG/G
CREAM TOPICAL
Qty: 30 G | Refills: 1 | Status: SHIPPED | OUTPATIENT
Start: 2020-12-16 | End: 2021-03-25

## 2020-12-16 ASSESSMENT — PAIN DESCRIPTION - PAIN TYPE: TYPE: ACUTE PAIN

## 2020-12-16 ASSESSMENT — PAIN - FUNCTIONAL ASSESSMENT: PAIN_FUNCTIONAL_ASSESSMENT: 0-10

## 2020-12-16 ASSESSMENT — PAIN DESCRIPTION - ORIENTATION: ORIENTATION: RIGHT;LEFT

## 2020-12-16 ASSESSMENT — PAIN SCALES - GENERAL
PAINLEVEL_OUTOF10: 3
PAINLEVEL_OUTOF10: 4

## 2020-12-16 ASSESSMENT — PAIN DESCRIPTION - DESCRIPTORS: DESCRIPTORS: BURNING

## 2020-12-16 ASSESSMENT — PAIN DESCRIPTION - LOCATION: LOCATION: FOOT

## 2020-12-16 NOTE — CARE COORDINATION
Cm met with patient due to lack of listed PCP. CM provider education regarding the purpose and benefits of having a primary provider to assist her with her health management. Patient states she is currently a resident of a sober living house and an appointment with a primary care provider is being set up on her behalf by Liana Haskins. Patient does not know the provider's name, but states the appointment is scheduled for this week or next.

## 2020-12-16 NOTE — ED PROVIDER NOTES
HPI:  20, Time: 11:02 AM DON Abdul is a 37 y.o. female presenting to the ED for rash on feet, beginning several months ago. The complaint has been persistent, mild in severity, and worsened by nothing. Patient has noticed a rash between the toes on both feet. It is scaling and erythematous. It is mildly tender. There is been no warmth to the touch no fever no chills. No erythema extending up the foot no soft tissue swelling    ROS:   Pertinent positives and negatives are stated within HPI, all other systems reviewed and are negative.  --------------------------------------------- PAST HISTORY ---------------------------------------------  Past Medical History:  has a past medical history of Endometriosis and Pneumonia. Past Surgical History:  has a past surgical history that includes Foot surgery (Right);  section; and Dilation and curettage of uterus. Social History:  reports that she has been smoking cigarettes. She has been smoking about 1.00 pack per day. She has never used smokeless tobacco. She reports previous alcohol use. She reports previous drug use. Family History: family history is not on file. The patients home medications have been reviewed. Allergies: Patient has no known allergies. ---------------------------------------------------PHYSICAL EXAM--------------------------------------     Constitutional/General: Alert and oriented x3, well appearing, non toxic in NAD  Head: Normocephalic and atraumatic  Eyes: PERRL, EOMI  Mouth: Oropharynx clear, handling secretions, no trismus  Neck: Supple, full ROM, non tender to palpation in the midline, no stridor, no crepitus, no meningeal signs  Pulmonary: Lungs clear to auscultation bilaterally, no wheezes, rales, or rhonchi. Not in respiratory distress  Cardiovascular:  Regular rate. Regular rhythm. No murmurs, gallops, or rubs. 2+ distal pulses  Chest: no chest wall tenderness  Abdomen: Soft.   Non tender. Non distended. +BS. No rebound, guarding, or rigidity. No pulsatile masses appreciated. Musculoskeletal: Moves all extremities x 4. Warm and well perfused, no clubbing, cyanosis, or edema. Capillary refill <3 seconds  Skin: warm and dry. Scaling rash in between the toes of both feet consistent with tinea pedis. Neurologic: GCS 15, CN 2-12 grossly intact, no focal deficits, symmetric strength 5/5 in the upper and lower extremities bilaterally  Psych: Normal Affect    -------------------------------------------------- RESULTS -------------------------------------------------  I have personally reviewed all laboratory and imaging results for this patient. Results are listed below. LABS:  No results found for this visit on 12/16/20. RADIOLOGY:  Interpreted by Radiologist.  No orders to display             ------------------------- NURSING NOTES AND VITALS REVIEWED ---------------------------   The nursing notes within the ED encounter and vital signs as below have been reviewed by myself. BP (!) 145/101   Pulse 86   Temp 97.8 °F (36.6 °C) (Temporal)   Resp 17   Ht 5' 6\" (1.676 m)   Wt 240 lb (108.9 kg)   LMP 11/25/2020   SpO2 94%   BMI 38.74 kg/m²   Oxygen Saturation Interpretation: Normal    The patients available past medical records and past encounters were reviewed. ------------------------------ ED COURSE/MEDICAL DECISION MAKING----------------------  Medications - No data to display          Medical Decision Making:    The patient presents with tinea pedis. She was treated with ketoconazole. She was advised to follow-up with primary medical doctor in 1 week. She was advised to return to the emergency room with any worsening symptoms            This patient's ED course included: a personal history and physicial eaxmination    This patient has remained hemodynamically stable during their ED course. Counseling:    The emergency provider has spoken with the patient and discussed todays results, in addition to providing specific details for the plan of care and counseling regarding the diagnosis and prognosis. Questions are answered at this time and they are agreeable with the plan.       --------------------------------- IMPRESSION AND DISPOSITION ---------------------------------    IMPRESSION  1. Tinea pedis of both feet        DISPOSITION  Disposition: Discharge to home  Patient condition is good        NOTE: This report was transcribed using voice recognition software.  Every effort was made to ensure accuracy; however, inadvertent computerized transcription errors may be present          Carlos MD Param  12/16/20 2252

## 2020-12-16 NOTE — ED TRIAGE NOTES
Pt to ed from home via triage with c/o bialteral foot pain associated with wounds to feel. This RN did not assess wounds. Per dr Víctor Marlow, pt has athletes foot to both feet. PT to receive no testing or meds in ed. PT amb with steady gait.  NO s/s of distress

## 2021-03-15 ENCOUNTER — NURSE TRIAGE (OUTPATIENT)
Dept: OTHER | Facility: CLINIC | Age: 41
End: 2021-03-15

## 2021-03-15 ENCOUNTER — APPOINTMENT (OUTPATIENT)
Dept: GENERAL RADIOLOGY | Age: 41
End: 2021-03-15
Payer: MEDICARE

## 2021-03-15 ENCOUNTER — HOSPITAL ENCOUNTER (EMERGENCY)
Age: 41
Discharge: HOME | End: 2021-03-15
Attending: STUDENT IN AN ORGANIZED HEALTH CARE EDUCATION/TRAINING PROGRAM
Payer: MEDICARE

## 2021-03-15 VITALS
SYSTOLIC BLOOD PRESSURE: 110 MMHG | HEIGHT: 66 IN | WEIGHT: 245 LBS | OXYGEN SATURATION: 99 % | TEMPERATURE: 96.7 F | RESPIRATION RATE: 18 BRPM | BODY MASS INDEX: 39.37 KG/M2 | HEART RATE: 68 BPM | DIASTOLIC BLOOD PRESSURE: 65 MMHG

## 2021-03-15 DIAGNOSIS — F17.200 TOBACCO DEPENDENCE: ICD-10-CM

## 2021-03-15 DIAGNOSIS — J98.01 BRONCHOSPASM: Primary | ICD-10-CM

## 2021-03-15 LAB
ALBUMIN SERPL-MCNC: 3.9 G/DL (ref 3.5–4.6)
ALP BLD-CCNC: 84 U/L (ref 40–130)
ALT SERPL-CCNC: 8 U/L (ref 0–33)
ANION GAP SERPL CALCULATED.3IONS-SCNC: 7 MEQ/L (ref 9–15)
APTT: 31.4 SEC (ref 24.4–36.8)
AST SERPL-CCNC: 13 U/L (ref 0–35)
BASOPHILS ABSOLUTE: 0.1 K/UL (ref 0–0.2)
BASOPHILS RELATIVE PERCENT: 0.7 %
BILIRUB SERPL-MCNC: <0.2 MG/DL (ref 0.2–0.7)
BUN BLDV-MCNC: 13 MG/DL (ref 6–20)
C-REACTIVE PROTEIN, HIGH SENSITIVITY: 11.8 MG/L (ref 0–5)
CALCIUM SERPL-MCNC: 9.2 MG/DL (ref 8.5–9.9)
CHLORIDE BLD-SCNC: 101 MEQ/L (ref 95–107)
CO2: 30 MEQ/L (ref 20–31)
CREAT SERPL-MCNC: 0.49 MG/DL (ref 0.5–0.9)
D DIMER: 0.44 MG/L FEU (ref 0–0.5)
EKG ATRIAL RATE: 88 BPM
EKG P AXIS: 66 DEGREES
EKG P-R INTERVAL: 184 MS
EKG Q-T INTERVAL: 376 MS
EKG QRS DURATION: 88 MS
EKG QTC CALCULATION (BAZETT): 454 MS
EKG R AXIS: 45 DEGREES
EKG T AXIS: 44 DEGREES
EKG VENTRICULAR RATE: 88 BPM
EOSINOPHILS ABSOLUTE: 0.2 K/UL (ref 0–0.7)
EOSINOPHILS RELATIVE PERCENT: 3.2 %
GFR AFRICAN AMERICAN: >60
GFR NON-AFRICAN AMERICAN: >60
GLOBULIN: 3.9 G/DL (ref 2.3–3.5)
GLUCOSE BLD-MCNC: 105 MG/DL (ref 70–99)
HCG, URINE, POC: NEGATIVE
HCT VFR BLD CALC: 39.3 % (ref 37–47)
HEMOGLOBIN: 13 G/DL (ref 12–16)
INR BLD: 0.9
LYMPHOCYTES ABSOLUTE: 1.4 K/UL (ref 1–4.8)
LYMPHOCYTES RELATIVE PERCENT: 18.6 %
Lab: NORMAL
MAGNESIUM: 1.9 MG/DL (ref 1.7–2.4)
MCH RBC QN AUTO: 29.5 PG (ref 27–31.3)
MCHC RBC AUTO-ENTMCNC: 33 % (ref 33–37)
MCV RBC AUTO: 89.5 FL (ref 82–100)
MONOCYTES ABSOLUTE: 0.6 K/UL (ref 0.2–0.8)
MONOCYTES RELATIVE PERCENT: 8.2 %
NEGATIVE QC PASS/FAIL: NORMAL
NEUTROPHILS ABSOLUTE: 5.2 K/UL (ref 1.4–6.5)
NEUTROPHILS RELATIVE PERCENT: 69.3 %
PDW BLD-RTO: 14.7 % (ref 11.5–14.5)
PLATELET # BLD: 260 K/UL (ref 130–400)
POSITIVE QC PASS/FAIL: NORMAL
POTASSIUM SERPL-SCNC: 4.1 MEQ/L (ref 3.4–4.9)
PRO-BNP: 53 PG/ML
PROTHROMBIN TIME: 12.5 SEC (ref 12.3–14.9)
RBC # BLD: 4.4 M/UL (ref 4.2–5.4)
SODIUM BLD-SCNC: 138 MEQ/L (ref 135–144)
TOTAL CK: 107 U/L (ref 0–170)
TOTAL PROTEIN: 7.8 G/DL (ref 6.3–8)
TROPONIN: <0.01 NG/ML (ref 0–0.01)
TSH SERPL DL<=0.05 MIU/L-ACNC: 1.89 UIU/ML (ref 0.44–3.86)
WBC # BLD: 7.5 K/UL (ref 4.8–10.8)

## 2021-03-15 PROCEDURE — 6360000002 HC RX W HCPCS: Performed by: STUDENT IN AN ORGANIZED HEALTH CARE EDUCATION/TRAINING PROGRAM

## 2021-03-15 PROCEDURE — 71045 X-RAY EXAM CHEST 1 VIEW: CPT

## 2021-03-15 PROCEDURE — 80053 COMPREHEN METABOLIC PANEL: CPT

## 2021-03-15 PROCEDURE — 85025 COMPLETE CBC W/AUTO DIFF WBC: CPT

## 2021-03-15 PROCEDURE — 86141 C-REACTIVE PROTEIN HS: CPT

## 2021-03-15 PROCEDURE — 85610 PROTHROMBIN TIME: CPT

## 2021-03-15 PROCEDURE — 36415 COLL VENOUS BLD VENIPUNCTURE: CPT

## 2021-03-15 PROCEDURE — 84443 ASSAY THYROID STIM HORMONE: CPT

## 2021-03-15 PROCEDURE — 85379 FIBRIN DEGRADATION QUANT: CPT

## 2021-03-15 PROCEDURE — 82550 ASSAY OF CK (CPK): CPT

## 2021-03-15 PROCEDURE — 96375 TX/PRO/DX INJ NEW DRUG ADDON: CPT

## 2021-03-15 PROCEDURE — 83880 ASSAY OF NATRIURETIC PEPTIDE: CPT

## 2021-03-15 PROCEDURE — 83735 ASSAY OF MAGNESIUM: CPT

## 2021-03-15 PROCEDURE — 99285 EMERGENCY DEPT VISIT HI MDM: CPT

## 2021-03-15 PROCEDURE — 84484 ASSAY OF TROPONIN QUANT: CPT

## 2021-03-15 PROCEDURE — 93005 ELECTROCARDIOGRAM TRACING: CPT | Performed by: STUDENT IN AN ORGANIZED HEALTH CARE EDUCATION/TRAINING PROGRAM

## 2021-03-15 PROCEDURE — 85730 THROMBOPLASTIN TIME PARTIAL: CPT

## 2021-03-15 PROCEDURE — 96365 THER/PROPH/DIAG IV INF INIT: CPT

## 2021-03-15 RX ORDER — PREDNISONE 50 MG/1
50 TABLET ORAL DAILY
Qty: 5 TABLET | Refills: 0 | Status: SHIPPED | OUTPATIENT
Start: 2021-03-15 | End: 2021-03-20

## 2021-03-15 RX ORDER — ESCITALOPRAM OXALATE 10 MG/1
10 TABLET ORAL DAILY
COMMUNITY
End: 2021-08-26

## 2021-03-15 RX ORDER — BUSPIRONE HYDROCHLORIDE 15 MG/1
15 TABLET ORAL 2 TIMES DAILY
COMMUNITY
End: 2021-07-22 | Stop reason: SDUPTHER

## 2021-03-15 RX ORDER — ECHINACEA PURPUREA EXTRACT 125 MG
2 TABLET ORAL
Qty: 1 BOTTLE | Refills: 0 | Status: SHIPPED | OUTPATIENT
Start: 2021-03-15 | End: 2021-03-25

## 2021-03-15 RX ORDER — DEXAMETHASONE SODIUM PHOSPHATE 4 MG/ML
10 INJECTION, SOLUTION INTRA-ARTICULAR; INTRALESIONAL; INTRAMUSCULAR; INTRAVENOUS; SOFT TISSUE ONCE
Status: COMPLETED | OUTPATIENT
Start: 2021-03-15 | End: 2021-03-15

## 2021-03-15 RX ORDER — ENALAPRIL MALEATE 5 MG/1
5 TABLET ORAL DAILY
COMMUNITY
End: 2021-03-25

## 2021-03-15 RX ORDER — MAGNESIUM SULFATE IN WATER 40 MG/ML
4000 INJECTION, SOLUTION INTRAVENOUS ONCE
Status: COMPLETED | OUTPATIENT
Start: 2021-03-15 | End: 2021-03-15

## 2021-03-15 RX ADMIN — DEXAMETHASONE SODIUM PHOSPHATE 10 MG: 4 INJECTION, SOLUTION INTRAMUSCULAR; INTRAVENOUS at 15:54

## 2021-03-15 RX ADMIN — MAGNESIUM SULFATE HEPTAHYDRATE 4000 MG: 40 INJECTION, SOLUTION INTRAVENOUS at 15:54

## 2021-03-15 ASSESSMENT — PAIN DESCRIPTION - PAIN TYPE: TYPE: ACUTE PAIN

## 2021-03-15 NOTE — TELEPHONE ENCOUNTER
[facility]. Brief description of triage: Roni Andrews states that she was trying to establish care with a new PCP and needed to talk with a nurse. She has swelling of bother legs, a cough and SOB at rest. She states that she has had a cardiac evaluation recently that was abnormal.  Please see triage below for more detailed information. Triage indicates for patient to go to ED    Care advice provided, patient verbalizes understanding; denies any other questions or concerns; instructed to call back for any new or worsening symptoms. Attention Provider: Thank you for allowing me to participate in the care of your patient. The patient was connected to triage in response to information provided to the [facility]. Please do not respond through this encounter as the response is not directed to a shared pool. Reason for Disposition   Difficulty breathing at rest    Answer Assessment - Initial Assessment Questions  1. ONSET: \"When did the swelling start? \" (e.g., minutes, hours, days)      3 weeks    2. LOCATION: \"What part of the leg is swollen? \"  \"Are both legs swollen or just one leg? \"      Up to the thigh bilaterally    3. SEVERITY: \"How bad is the swelling? \" (e.g., localized; mild, moderate, severe)   - Localized - small area of swelling localized to one leg   - MILD pedal edema - swelling limited to foot and ankle, pitting edema < 1/4 inch (6 mm) deep, rest and elevation eliminate most or all swelling   - MODERATE edema - swelling of lower leg to knee, pitting edema > 1/4 inch (6 mm) deep, rest and elevation only partially reduce swelling   - SEVERE edema - swelling extends above knee, facial or hand swelling present       Severe but not sure about swelling in the face    4. REDNESS: \"Does the swelling look red or infected? \"      Denies redness    5. PAIN: \"Is the swelling painful to touch? \" If so, ask: \"How painful is it? \"   (Scale 1-10; mild, moderate

## 2021-03-16 PROCEDURE — 93010 ELECTROCARDIOGRAM REPORT: CPT | Performed by: INTERNAL MEDICINE

## 2021-03-24 NOTE — PROGRESS NOTES
3/25/2021    Arabella Rey (:  1980) is a 41 y.o. female, here for evaluation of the following medical concerns:  Chief Complaint   Patient [place] with   [address] [facility] [place] [facility]     [place]15/21 Bronchitis.  Leg Swelling     Pt states she had a heart and lung testing a year ago that came back abnormal. Would like her legs checked out.  Health Maintenance     States she has email from hospital to sign up for MyChart. HPI  Pt seen in the ER 3/15 due to CP, SOB and LE edema  Basic labs (BNP, TSH normal, cardiac markers normal), EKG and CXR normal  Dx with bronchospasm   Given albuterol inhaler, d/c home with prednisone  Told to f/u with pulmonology  Has apt in May    Leg swelling  Started 3-4 weeks ago  No prior episodes  No dietary changes  No leg pain  Swelling seems to be persist   Nothing makes it better or worse  Endorses SOB with exertion and palpitations  Denied chest pain  EKG in the ER normal    Establishing care   Last PCP was 1.5 years ago  PMH: endometriosis, permanent nerve damage R leg, anxiety and depression    Review of Systems   Constitutional: Negative for chills and fever. HENT: Negative for congestion, sinus pressure and sore throat. Respiratory: Negative for cough and shortness of breath. Cardiovascular: Positive for leg swelling. Negative for chest pain and palpitations. Gastrointestinal: Negative for abdominal pain and vomiting. Musculoskeletal: Negative for arthralgias and myalgias. Skin: Negative for rash and wound. Neurological: Negative for speech difficulty and light-headedness. Psychiatric/Behavioral: Negative for suicidal ideas. The patient is not nervous/anxious. Prior to Visit Medications    Medication Sig Taking?  Authorizing Provider   enalapril maleate-HCTZ 5-12.5 MG TABS  Yes Historical Provider, MD   ibuprofen (ADVIL;MOTRIN) 800 MG tablet  Yes Historical Provider, MD   traZODone (DESYREL) 50 MG tablet  Yes Historical Provider, MD   buprenorphine-naloxone (SUBOXONE) 8-2 MG SUBL SL tablet  Yes Historical Provider, MD   escitalopram (LEXAPRO) 10 MG tablet Take 10 mg by mouth daily Yes Historical Provider, MD   busPIRone (BUSPAR) 15 MG tablet Take 15 mg by mouth 2 times daily Yes Historical Provider, MD   albuterol sulfate  (90 Base) MCG/ACT inhaler Inhale 2 puffs into the lungs every 6 hours as needed for Wheezing or Shortness of Breath Yes Uri Best MD        No Known Allergies    Past Medical History:   Diagnosis Date    AC (acromioclavicular) joint bone spurs     spine    Anxiety     Depression     Endometriosis     Hypertension     Nerve damage of right foot     Pneumonia        Past Surgical History:   Procedure Laterality Date     SECTION      DILATION AND CURETTAGE OF UTERUS      FOOT SURGERY Right     x2       Social History     Socioeconomic History    Marital status:      Spouse name: Not on file    Number of children: Not on file    Years of education: Not on file    Highest education level: Not on file   Occupational History    Not on file   Social Needs    Financial resource strain: Not hard at all   [place] [employer] insecurity     Worry: Never true     Inability: Never true   [employer] needs     Medical: No     Non-medical: No   Tobacco Use    Smoking status: Current Every Day Smoker     Packs/day: 1.00     Types: Cigarettes    Smokeless tobacco: Never Used   Substance and Sexual Activity    Alcohol use: Not Currently    Drug use: Not Currently    Sexual activity: Not on file   Lifestyle    Physical activity     Days per week: Not on file     Minutes per session: Not on file    Stress: Not on file   Relationships    Social connections     Talks on phone: Not on file     Gets together: Not on file     Attends Anabaptist service: Not on file     Active member of club or organization: Not on file     Attends meetings of clubs or organizations: Not on file Relationship status: Not on file    Intimate partner violence     Fear of current or ex partner: Not on file     Emotionally abused: Not on file     Physically abused: Not on file     Forced sexual activity: Not on file   Other Topics Concern    Not on file   Social History Narrative    Not on file        Family History   Problem Relation Age of Onset    Heart Attack Father     Cancer Maternal Grandmother         pancreatic    Breast Cancer Paternal Grandmother     Cancer Paternal Grandmother         lung    Heart Attack Paternal Grandfather     Diabetes Neg Hx     Kidney Disease Neg Hx     Stroke Neg Hx        Vitals:    03/25/21 1155   BP: 124/76   Pulse: 78   Temp: 98 °F (36.7 °C)   SpO2: 96%   Weight: 252 lb (114.3 kg)   Height: 5' 6\" (1.676 m)       Estimated body mass index is 40.67 kg/m² as calculated from the following:    Height as of this encounter: 5' 6\" (1.676 m). Weight as of this encounter: 252 lb (114.3 kg). No results for input(s): WBC, RBC, HGB, HCT, MCV, MCH, MCHC, RDW, PLT, MPV in the last 72 hours. No results for input(s): NA, K, CL, CO2, BUN, CREATININE, GLUCOSE, CALCIUM, PROT, LABALBU, BILITOT, ALKPHOS, AST, ALT in the last 72 hours. No results found for: LABA1C    Xr Chest Portable    Result Date: 3/15/2021  No radiographic evidence of acute intrathoracic process. Physical Exam  Constitutional:       Appearance: Normal appearance. She is normal weight. HENT:      Head: Normocephalic and atraumatic. Eyes:      Extraocular Movements: Extraocular movements intact. Conjunctiva/sclera: Conjunctivae normal.   Cardiovascular:      Rate and Rhythm: Normal rate and regular rhythm. Pulses: Normal pulses. Heart sounds: Normal heart sounds. Pulmonary:      Effort: Pulmonary effort is normal.      Breath sounds: Normal breath sounds. No wheezing or rales. Musculoskeletal:      Comments: No pitting edema present   Skin:     General: Skin is warm. Capillary Refill: Capillary refill takes less than 2 seconds. Findings: No rash. Neurological:      Mental Status: She is alert. Psychiatric:         Mood and Affect: Mood normal.         Thought Content: Thought content normal.         Judgment: Judgment normal.         ASSESSMENT/PLAN:  1. Essential hypertension  - Blood pressure well controlled, continue current antihypertensive therapy    2. Anxiety    3. Major depressive disorder with current active episode, unspecified depression episode severity, unspecified whether recurrent      4. Insomnia, unspecified type      5. Lower extremity edema  - Recent labs in the ER were normal including BNP, kidney function, liver function, electrolytes  - Echocardiogram complete; Future    6. Dyspnea on exertion  - EKG in the ER was normal  - Can get ECHO as pt states she had an abnormality on prior ECHO    - Echocardiogram complete; Future      ---------------------------------------------------------------------  Side effects, adverse effects of the medication prescribed today, as well as treatment plan/ rationale and result expectations have been discussed with the patient who expresses understanding and desires to proceed. Close follow up to evaluate treatment results and for coordination of care. I have reviewed the patient's medical history in detail and updated the computerized patient record. As always, patient is advised that if symptoms worsen in any way they will proceed to the nearest emergency room. --------------------------------------------------------------------    Return in about 3 weeks (around 4/15/2021) for f/u echo. An  electronic signature was used to authenticate this note.     --Brice Read DO on 3/25/2021 at 12:29 PM

## 2021-03-25 ENCOUNTER — OFFICE VISIT (OUTPATIENT)
Dept: FAMILY MEDICINE CLINIC | Age: 41
End: 2021-03-25
Payer: MEDICARE

## 2021-03-25 VITALS
BODY MASS INDEX: 40.5 KG/M2 | HEART RATE: 78 BPM | WEIGHT: 252 LBS | HEIGHT: 66 IN | DIASTOLIC BLOOD PRESSURE: 76 MMHG | SYSTOLIC BLOOD PRESSURE: 124 MMHG | OXYGEN SATURATION: 96 % | TEMPERATURE: 98 F

## 2021-03-25 DIAGNOSIS — F32.9 MAJOR DEPRESSIVE DISORDER WITH CURRENT ACTIVE EPISODE, UNSPECIFIED DEPRESSION EPISODE SEVERITY, UNSPECIFIED WHETHER RECURRENT: ICD-10-CM

## 2021-03-25 DIAGNOSIS — R06.09 DYSPNEA ON EXERTION: ICD-10-CM

## 2021-03-25 DIAGNOSIS — R60.0 LOWER EXTREMITY EDEMA: ICD-10-CM

## 2021-03-25 DIAGNOSIS — F41.9 ANXIETY: ICD-10-CM

## 2021-03-25 DIAGNOSIS — I10 ESSENTIAL HYPERTENSION: Primary | ICD-10-CM

## 2021-03-25 DIAGNOSIS — G47.00 INSOMNIA, UNSPECIFIED TYPE: ICD-10-CM

## 2021-03-25 PROCEDURE — 4004F PT TOBACCO SCREEN RCVD TLK: CPT | Performed by: STUDENT IN AN ORGANIZED HEALTH CARE EDUCATION/TRAINING PROGRAM

## 2021-03-25 PROCEDURE — G8484 FLU IMMUNIZE NO ADMIN: HCPCS | Performed by: STUDENT IN AN ORGANIZED HEALTH CARE EDUCATION/TRAINING PROGRAM

## 2021-03-25 PROCEDURE — 99204 OFFICE O/P NEW MOD 45 MIN: CPT | Performed by: STUDENT IN AN ORGANIZED HEALTH CARE EDUCATION/TRAINING PROGRAM

## 2021-03-25 PROCEDURE — G8427 DOCREV CUR MEDS BY ELIG CLIN: HCPCS | Performed by: STUDENT IN AN ORGANIZED HEALTH CARE EDUCATION/TRAINING PROGRAM

## 2021-03-25 PROCEDURE — G8417 CALC BMI ABV UP PARAM F/U: HCPCS | Performed by: STUDENT IN AN ORGANIZED HEALTH CARE EDUCATION/TRAINING PROGRAM

## 2021-03-25 RX ORDER — BUPRENORPHINE HYDROCHLORIDE AND NALOXONE HYDROCHLORIDE DIHYDRATE 8; 2 MG/1; MG/1
TABLET SUBLINGUAL
COMMUNITY
Start: 2021-03-24

## 2021-03-25 RX ORDER — TRAZODONE HYDROCHLORIDE 50 MG/1
TABLET ORAL
COMMUNITY
Start: 2020-12-31 | End: 2021-04-26

## 2021-03-25 RX ORDER — ENALAPRIL MALEATE AND HYDROCHLOROTHIAZIDE 5; 12.5 MG/1; MG/1
TABLET ORAL
COMMUNITY
Start: 2021-03-23 | End: 2021-08-05 | Stop reason: SDUPTHER

## 2021-03-25 RX ORDER — IBUPROFEN 800 MG/1
TABLET ORAL
COMMUNITY
Start: 2021-03-23 | End: 2021-08-26 | Stop reason: SDUPTHER

## 2021-03-25 SDOH — ECONOMIC STABILITY: FOOD INSECURITY: WITHIN THE PAST 12 MONTHS, THE FOOD YOU BOUGHT JUST DIDN'T LAST AND YOU DIDN'T HAVE MONEY TO GET MORE.: NEVER TRUE

## 2021-03-25 SDOH — ECONOMIC STABILITY: TRANSPORTATION INSECURITY
IN THE PAST 12 MONTHS, HAS THE LACK OF TRANSPORTATION KEPT YOU FROM MEDICAL APPOINTMENTS OR FROM GETTING MEDICATIONS?: NO

## 2021-03-25 SDOH — ECONOMIC STABILITY: FOOD INSECURITY: WITHIN THE PAST 12 MONTHS, YOU WORRIED THAT YOUR FOOD WOULD RUN OUT BEFORE YOU GOT MONEY TO BUY MORE.: NEVER TRUE

## 2021-03-25 SDOH — ECONOMIC STABILITY: INCOME INSECURITY: HOW HARD IS IT FOR YOU TO PAY FOR THE VERY BASICS LIKE FOOD, HOUSING, MEDICAL CARE, AND HEATING?: NOT HARD AT ALL

## 2021-03-25 SDOH — ECONOMIC STABILITY: TRANSPORTATION INSECURITY
IN THE PAST 12 MONTHS, HAS LACK OF TRANSPORTATION KEPT YOU FROM MEETINGS, WORK, OR FROM GETTING THINGS NEEDED FOR DAILY LIVING?: NO

## 2021-03-25 ASSESSMENT — ENCOUNTER SYMPTOMS
SINUS PRESSURE: 0
SHORTNESS OF BREATH: 0
COUGH: 0
VOMITING: 0
SORE THROAT: 0
ABDOMINAL PAIN: 0

## 2021-03-25 ASSESSMENT — PATIENT HEALTH QUESTIONNAIRE - PHQ9
SUM OF ALL RESPONSES TO PHQ QUESTIONS 1-9: 0
SUM OF ALL RESPONSES TO PHQ QUESTIONS 1-9: 0
2. FEELING DOWN, DEPRESSED OR HOPELESS: 0
1. LITTLE INTEREST OR PLEASURE IN DOING THINGS: 0

## 2021-04-02 ASSESSMENT — ENCOUNTER SYMPTOMS
WHEEZING: 1
CHEST TIGHTNESS: 1
DIARRHEA: 0
VOMITING: 0
SINUS PRESSURE: 0
COUGH: 1
ABDOMINAL PAIN: 0
BACK PAIN: 0
NAUSEA: 0
SHORTNESS OF BREATH: 1
TROUBLE SWALLOWING: 0

## 2021-04-03 NOTE — ED PROVIDER NOTES
[address] [facility] ED  eMERGENCY dEPARTMENT eNCOUnter      Pt Name: Yaneth Leonard  MRN: [id number]  [place] 1980  Date of evaluation: 3/15/2021  Provider: Tacos Herrera DO    CHIEF COMPLAINT       Chief Complaint   Patient presents with    Shortness of Breath    Chest Pain    Leg Swelling     bilat, x 3 weeks         HISTORY OF PRESENT ILLNESS   (Location/Symptom, Timing/Onset,Context/Setting, Quality, Duration, Modifying Factors, Severity)  Note limiting factors. Yaneth Leonard is a 41 y.o. female who presents to the emergency department with complaint of chest tightness, shortness of breath, cough, and bilateral leg edema x3 weeks. On physical exam the patient is wheezing in bilateral lung fields. Patient denies any fever, chills, nausea or vomiting. Patient denies any modifying factors making her feel any better. Patient states when she starts coughing she feels short of breath. The history is provided by the patient. NursingNotes were reviewed. REVIEW OF SYSTEMS    (2-9 systems for level 4, 10 or more for level 5)     Review of Systems   Constitutional: Negative for activity change, appetite change, chills, diaphoresis, fever and unexpected weight change. HENT: Negative for drooling, ear pain, nosebleeds, sinus pressure and trouble swallowing. Respiratory: Positive for cough, chest tightness, shortness of breath and wheezing. Cardiovascular: Positive for chest pain and leg swelling. Gastrointestinal: Negative for abdominal pain, diarrhea, nausea and vomiting. Endocrine: Negative for polydipsia and polyphagia. Genitourinary: Negative for dysuria, flank pain and frequency. Musculoskeletal: Negative for back pain and myalgias. Skin: Negative for pallor and rash. Neurological: Negative for syncope, weakness and headaches. Hematological: Does not bruise/bleed easily. All other systems reviewed and are negative.       Except as noted above the remainder of the review of systems was reviewed and negative. PAST MEDICAL HISTORY     Past Medical History:   Diagnosis Date    AC (acromioclavicular) joint bone spurs     spine    Anxiety     Depression     Endometriosis     Hypertension     Nerve damage of right foot     Pneumonia          SURGICALHISTORY       Past Surgical History:   Procedure Laterality Date     SECTION      DILATION AND CURETTAGE OF UTERUS      FOOT SURGERY Right     x2         CURRENT MEDICATIONS       Discharge Medication List as of 3/15/2021  5:43 PM      CONTINUE these medications which have NOT CHANGED    Details   escitalopram (LEXAPRO) 10 MG tablet Take 10 mg by mouth dailyHistorical Med      busPIRone (BUSPAR) 15 MG tablet Take 15 mg by mouth 2 times dailyHistorical Med      Buprenorphine HCl-Naloxone HCl (SUBOXONE SL) Place under the tongueHistorical Med      enalapril (VASOTEC) 5 MG tablet Take 5 mg by mouth dailyHistorical Med      ketoconazole (NIZORAL) 2 % cream Apply topically daily. , Disp-30 g, R-1, Print      albuterol sulfate  (90 Base) MCG/ACT inhaler Inhale 2 puffs into the lungs every 6 hours as needed for Wheezing or Shortness of Breath, Disp-1 Inhaler,R-0Print             ALLERGIES     Patient has no known allergies.     FAMILY HISTORY       Family History   Problem Relation Age of Onset    Heart Attack Father     Cancer Maternal Grandmother         pancreatic    Breast Cancer Paternal Grandmother     Cancer Paternal Grandmother         lung    Heart Attack Paternal Grandfather     Diabetes Neg Hx     Kidney Disease Neg Hx     Stroke Neg Hx           SOCIAL HISTORY       Social History     Socioeconomic History    Marital status:      Spouse name: None    Number of children: None    Years of education: None    Highest education level: None   Occupational History    None   Social Needs    Financial resource strain: Not hard at all   [employer] insecurity     Worry: Never true     Inability: Never true    Transportation needs     Medical: No     Non-medical: No   Tobacco Use    Smoking status: Current Every Day Smoker     Packs/day: 1.00     Types: Cigarettes    Smokeless tobacco: Never Used   Substance and Sexual Activity    Alcohol use: Not Currently    Drug use: Not Currently    Sexual activity: None   Lifestyle    Physical activity     Days per week: None     Minutes per session: None    Stress: None   Relationships    Social connections     Talks on phone: None     Gets together: None     Attends Roman Catholic service: None     Active member of club or organization: None     Attends meetings of clubs or organizations: None     Relationship status: None    Intimate partner violence     Fear of current or ex partner: None     Emotionally abused: None     Physically abused: None     Forced sexual activity: None   Other Topics Concern    None   Social History Narrative    None       SCREENINGS    Albino Coma Scale  Eye Opening: Spontaneous  Best Verbal Response: Oriented  Best Motor Response: Obeys commands  Albino Coma Scale Score: 15 @FLOW(10270017)@      PHYSICAL EXAM    (up to 7 for level 4, 8 or more for level 5)     ED Triage Vitals [03/15/21 1508]   BP Temp Temp Source Pulse Resp SpO2 Height Weight   (!) 145/99 96.7 °F (35.9 °C) Temporal 93 22 98 % 5' 6\" (1.676 m) 245 lb (111.1 kg)       Physical Exam  Vitals signs and nursing note reviewed. Constitutional:       General: She is awake. She is in acute distress. Appearance: Normal appearance. She is well-developed and well-groomed. She is morbidly obese. She is not ill-appearing, toxic-appearing or diaphoretic. Comments: No photophobia. No phonophobia. HENT:      Head: Normocephalic and atraumatic. No Lauren's sign. Right Ear: External ear normal.      Left Ear: External ear normal.      Nose: Nose normal. No congestion or rhinorrhea.       Mouth/Throat:      Mouth: Mucous membranes are moist.      Pharynx: Oropharynx is clear. No oropharyngeal exudate or posterior oropharyngeal erythema. Eyes:      General: No scleral icterus. Right eye: No foreign body or discharge. Left eye: No discharge. Extraocular Movements: Extraocular movements intact. Conjunctiva/sclera: Conjunctivae normal.      Left eye: No exudate. Pupils: Pupils are equal, round, and reactive to light. Neck:      Musculoskeletal: Normal range of motion and neck supple. No neck rigidity. Vascular: No JVD. Trachea: No tracheal deviation. Comments: No meningismus. Cardiovascular:      Rate and Rhythm: Normal rate and regular rhythm. Chest Wall: No thrill. Pulses:           Radial pulses are 2+ on the right side and 2+ on the left side. Heart sounds: Normal heart sounds, S1 normal and S2 normal. Heart sounds not distant. No murmur. No systolic murmur. No diastolic murmur. No friction rub. No gallop. No S3 or S4 sounds. Pulmonary:      Effort: Pulmonary effort is normal. Tachypnea present. No respiratory distress. Breath sounds: No stridor. Examination of the right-upper field reveals wheezing. Examination of the left-upper field reveals wheezing. Examination of the right-middle field reveals wheezing. Examination of the left-middle field reveals wheezing. Examination of the right-lower field reveals wheezing. Wheezing present. No rhonchi or rales. Chest:      Chest wall: No tenderness. Abdominal:      General: Abdomen is flat. Bowel sounds are normal. There is no distension. Palpations: Abdomen is soft. There is no mass. Tenderness: There is no abdominal tenderness. There is no right CVA tenderness, left CVA tenderness, guarding or rebound. Hernia: No hernia is present. Musculoskeletal: Normal range of motion. General: No swelling, tenderness, deformity or signs of injury. Right lower le+ Pitting Edema present. Left lower le+ Pitting Edema present. Lymphadenopathy:      Head:      Right side of head: No submental adenopathy. Left side of head: No submental adenopathy. Skin:     General: Skin is warm and dry. Capillary Refill: Capillary refill takes less than 2 seconds. Coloration: Skin is not jaundiced or pale. Findings: No bruising, erythema, lesion or rash. Neurological:      General: No focal deficit present. Mental Status: She is alert and oriented to person, place, and time. Mental status is at baseline. Cranial Nerves: No cranial nerve deficit. Sensory: No sensory deficit. Motor: No weakness. Coordination: Coordination normal.      Deep Tendon Reflexes: Reflexes are normal and symmetric. Psychiatric:         Mood and Affect: Mood normal.         Behavior: Behavior normal. Behavior is cooperative. Thought Content: Thought content normal.         Judgment: Judgment normal.         DIAGNOSTIC RESULTS     EKG: All EKG's are interpreted by the Emergency Department Physician who either signs or Co-signsthis chart in the absence of a cardiologist.    EKG: Sinus rhythm at 88 bpm, normal axis, right bundle branch block, QT interval of 366 ms. Normal ST segments. No PVCs. RADIOLOGY:   Non-plain filmimages such as CT, Ultrasound and MRI are read by the radiologist. Plain radiographic images are visualized and preliminarily interpreted by the emergency physician with the below findings:    Chest x-ray: No infiltrate, no pneumothorax, no pleural effusion, no free air.     Interpretation per the Radiologist below, if available at the time ofthis note:    XR CHEST PORTABLE   Final Result            ED BEDSIDE ULTRASOUND:   Performed by ED Physician - none    LABS:  Labs Reviewed   CBC WITH AUTO DIFFERENTIAL - Abnormal; Notable for the following components:       Result Value    RDW 14.7 (*)     All other components within normal limits   COMPREHENSIVE METABOLIC PANEL - Abnormal; Notable for the following components:    Anion Gap 7 (*)     Glucose 105 (*)     CREATININE 0.49 (*)     Globulin 3.9 (*)     All other components within normal limits   HIGH SENSITIVITY CRP - Abnormal; Notable for the following components:    CRP High Sensitivity 11.8 (*)     All other components within normal limits   APTT   BRAIN NATRIURETIC PEPTIDE   CK   MAGNESIUM   PROTIME-INR   TROPONIN   TSH WITHOUT REFLEX   D-DIMER, QUANTITATIVE   POC PREGNANCY UR-QUAL       All other labs were within normal range or not returned as of this dictation. EMERGENCY DEPARTMENT COURSE and DIFFERENTIAL DIAGNOSIS/MDM:   Vitals:    Vitals:    03/15/21 1508 03/15/21 1602 03/15/21 1712   BP: (!) 145/99 118/74 110/65   Pulse: 93 95 68   Resp: 22 18 18   Temp: 96.7 °F (35.9 °C)     TempSrc: Temporal     SpO2: 98% 99% 99%   Weight: 245 lb (111.1 kg)     Height: 5' 6\" (1.676 m)             MDM  Patient has no calf tenderness. She is not tachycardic. And well score is negative. Patient was wheezing on exam given IV steroids and IV magnesium. On reevaluation the lungs are clear. Patient be discharged with prescription for inhaler. Cardiac work-up shows no acute injury pattern on EKG, D-dimer is negative, troponin is negative, and the patient is not anemic. The findings were discussed with the patient. The patient was invited to return  to the ER if worse symptoms. The patient verbalized understanding of the care and they have no further questions. CONSULTS:  None    PROCEDURES:  Unless otherwise noted below, none     Procedures    FINAL IMPRESSION      1. Bronchospasm    2. Tobacco dependence          DISPOSITION/PLAN   DISPOSITION Decision To Discharge 03/15/2021 05:31:26 PM      PATIENT REFERRED TO:  Elkin Alfonso MD  [address] [facility]  [phone]    Call in 1 day      [facility] [address]  [phone]    Call in 2 days  Arrange a follow-up appointment.       DISCHARGE

## 2021-04-26 ENCOUNTER — OFFICE VISIT (OUTPATIENT)
Dept: FAMILY MEDICINE CLINIC | Age: 41
End: 2021-04-26
Payer: MEDICARE

## 2021-04-26 VITALS
TEMPERATURE: 96.5 F | BODY MASS INDEX: 41.78 KG/M2 | WEIGHT: 260 LBS | HEART RATE: 76 BPM | OXYGEN SATURATION: 98 % | DIASTOLIC BLOOD PRESSURE: 76 MMHG | SYSTOLIC BLOOD PRESSURE: 110 MMHG | HEIGHT: 66 IN

## 2021-04-26 DIAGNOSIS — K04.7 TOOTH INFECTION: Primary | ICD-10-CM

## 2021-04-26 DIAGNOSIS — R63.5 WEIGHT GAIN: ICD-10-CM

## 2021-04-26 PROCEDURE — 4004F PT TOBACCO SCREEN RCVD TLK: CPT | Performed by: STUDENT IN AN ORGANIZED HEALTH CARE EDUCATION/TRAINING PROGRAM

## 2021-04-26 PROCEDURE — 99213 OFFICE O/P EST LOW 20 MIN: CPT | Performed by: STUDENT IN AN ORGANIZED HEALTH CARE EDUCATION/TRAINING PROGRAM

## 2021-04-26 PROCEDURE — G8427 DOCREV CUR MEDS BY ELIG CLIN: HCPCS | Performed by: STUDENT IN AN ORGANIZED HEALTH CARE EDUCATION/TRAINING PROGRAM

## 2021-04-26 PROCEDURE — G8417 CALC BMI ABV UP PARAM F/U: HCPCS | Performed by: STUDENT IN AN ORGANIZED HEALTH CARE EDUCATION/TRAINING PROGRAM

## 2021-04-26 RX ORDER — AMOXICILLIN AND CLAVULANATE POTASSIUM 875; 125 MG/1; MG/1
1 TABLET, FILM COATED ORAL 2 TIMES DAILY
Qty: 14 TABLET | Refills: 0 | Status: SHIPPED | OUTPATIENT
Start: 2021-04-26 | End: 2021-05-03

## 2021-04-26 ASSESSMENT — ENCOUNTER SYMPTOMS
ABDOMINAL PAIN: 0
COUGH: 0
SHORTNESS OF BREATH: 0
SINUS PRESSURE: 0
SORE THROAT: 0
VOMITING: 0

## 2021-04-26 NOTE — PATIENT INSTRUCTIONS
you feel better. You need to take the full course of antibiotics. To prevent tooth abscess  · Brush and floss every day. Have regular dental checkups. · Eat a healthy diet. Avoid sugary foods and drinks. · Do not smoke or vape with nicotine. And don't use spit tobacco. Tobacco and nicotine slow your ability to heal. They increase your risk for gum disease and cancer of the mouth and throat. If you need help quitting, talk to your doctor about stop-smoking programs and medicines. These can increase your chances of quitting for good. When should you call for help? Call 911 anytime you think you may need emergency care. For example, call if:    · You have trouble breathing. Call your doctor now or seek immediate medical care if:    · You have new or worse symptoms of infection, such as:  ? Increased pain, swelling, warmth, or redness. ? Red streaks leading from the area. ? Pus draining from the area. ? A fever. Watch closely for changes in your health, and be sure to contact your doctor if:    · You do not get better as expected. Where can you learn more? Go to https://3CI.Zipnosis. org and sign in to your [employer] account. Enter U218 in the [facility] box to learn more about \"Abscessed Tooth: Care Instructions. \"     If you do not have an account, please click on the \"Sign Up Now\" link. Current as of: October 27, 2020               Content Version: 12.8  © 6737-8929 [employer]wise, Incorporated. Care instructions adapted under license by [facility] ([facility]). If you have questions about a medical condition or this instruction, always ask your healthcare professional. [place] [address] any warranty or liability for your use of this information.

## 2021-04-26 NOTE — PROGRESS NOTES
2021    Marichuy Perales (:  1980) is a 41 y.o. female, here for evaluation of the following medical concerns:  Chief Complaint   Patient presents with    Dental Pain     x1 week Pt has taken tylenol and ibuprofen.  Weight Gain     Would like to know if current medications cause weight gain? HPI  Dental problem  Started one week ago  Has had dental infections in the past and symptoms feel similar   No fevers or chills  Right lower side  She has been taking some Tylenol and ibuprofen with mild relief in symptoms  She is planning on making an appointment through  Yamila Moeller    Weight gain  Pt states this is the heaviest she has been  Patient is wondering if due to her medications    Review of Systems   Constitutional: Negative for chills and fever. HENT: Positive for dental problem. Negative for congestion, sinus pressure and sore throat. Respiratory: Negative for cough and shortness of breath. Cardiovascular: Negative for chest pain and palpitations. Gastrointestinal: Negative for abdominal pain and vomiting. Musculoskeletal: Negative for arthralgias and myalgias. Skin: Negative for rash and wound. Neurological: Negative for speech difficulty and light-headedness. Psychiatric/Behavioral: Negative for suicidal ideas. The patient is not nervous/anxious. Prior to Visit Medications    Medication Sig Taking?  Authorizing Provider   amoxicillin-clavulanate (AUGMENTIN) 875-125 MG per tablet Take 1 tablet by mouth 2 times daily for 7 days Yes Yuli Wadsworth DO   enalapril maleate-HCTZ 5-12.5 MG TABS  Yes Historical Provider, MD   ibuprofen (ADVIL;MOTRIN) 800 MG tablet  Yes Historical Provider, MD   buprenorphine-naloxone (SUBOXONE) 8-2 MG SUBL SL tablet  Yes Historical Provider, MD   escitalopram (LEXAPRO) 10 MG tablet Take 10 mg by mouth daily Yes Historical Provider, MD   busPIRone (BUSPAR) 15 MG tablet Take 15 mg by mouth 2 times daily Yes Historical Provider, MD   albuterol sulfate  (90 Base) MCG/ACT inhaler Inhale 2 puffs into the lungs every 6 hours as needed for Wheezing or Shortness of Breath Yes Aspen Sanchez MD        No Known Allergies    Past Medical History:   Diagnosis Date    AC (acromioclavicular) joint bone spurs     spine    Anxiety     Depression     Endometriosis     Hypertension     Nerve damage of right foot     Pneumonia        Past Surgical History:   Procedure Laterality Date     SECTION      DILATION AND CURETTAGE OF UTERUS      FOOT SURGERY Right     x2       Social History     Socioeconomic History    Marital status:      Spouse name: Not on file    Number of children: Not on file    Years of education: Not on file    Highest education level: Not on file   Occupational History    Not on file   Social Needs    Financial resource strain: Not hard at all   [employer] insecurity     Worry: Never true     Inability: Never true   [place] needs     Medical: No     Non-medical: No   Tobacco Use    Smoking status: Current Every Day Smoker     Packs/day: 1.00     Types: Cigarettes    Smokeless tobacco: Never Used   Substance and Sexual Activity    Alcohol use: Not Currently    Drug use: Not Currently    Sexual activity: Not on file   Lifestyle    Physical activity     Days per week: Not on file     Minutes per session: Not on file    Stress: Not on file   Relationships    Social connections     Talks on phone: Not on file     Gets together: Not on file     Attends Shinto service: Not on file     Active member of club or organization: Not on file     Attends meetings of clubs or organizations: Not on file     Relationship status: Not on file    Intimate partner violence     Fear of current or ex partner: Not on file     Emotionally abused: Not on file     Physically abused: Not on file     Forced sexual activity: Not on file   Other Topics Concern    Not on file   Social History Narrative    Not amoxicillin-clavulanate (AUGMENTIN) 875-125 MG per tablet; Take 1 tablet by mouth 2 times daily for 7 days  Dispense: 14 tablet; Refill: 0    2. Weight gain  -Discussed that possibly related to Lexapro  -Patient sees Suboxone provider who also gives her BuSpar and Lexapro  -Patient will call her provider and see if this medication could be switch      ---------------------------------------------------------------------  Side effects, adverse effects of the medication prescribed today, as well as treatment plan/ rationale and result expectations have been discussed with the patient who expresses understanding and desires to proceed. Close follow up to evaluate treatment results and for coordination of care. I have reviewed the patient's medical history in detail and updated the computerized patient record. As always, patient is advised that if symptoms worsen in any way they will proceed to the nearest emergency room. --------------------------------------------------------------------    Return in about 4 weeks (around 5/24/2021) for Follow up chronic conditions. An  electronic signature was used to authenticate this note.     --You Palumbo, DO on 4/26/2021 at 10:00 AM

## 2021-05-14 ENCOUNTER — TELEPHONE (OUTPATIENT)
Dept: FAMILY MEDICINE CLINIC | Age: 41
End: 2021-05-14

## 2021-06-09 ENCOUNTER — TELEPHONE (OUTPATIENT)
Dept: FAMILY MEDICINE CLINIC | Age: 41
End: 2021-06-09

## 2021-06-11 ENCOUNTER — TELEPHONE (OUTPATIENT)
Dept: FAMILY MEDICINE CLINIC | Age: 41
End: 2021-06-11

## 2021-07-13 ENCOUNTER — PATIENT MESSAGE (OUTPATIENT)
Dept: FAMILY MEDICINE CLINIC | Age: 41
End: 2021-07-13

## 2021-07-13 NOTE — TELEPHONE ENCOUNTER
From: Richar Reveal  To: Carlota Ovalle DO  Sent: 7/13/2021 10:26 AM EDT  Subject: Test Results Question    Hello I recently had an echocardiogram is it possible to get the results by doing an evisit? I also have some weight gain concerns I need to discuss. ...  Ty

## 2021-07-14 DIAGNOSIS — R60.0 LOWER EXTREMITY EDEMA: Primary | ICD-10-CM

## 2021-07-14 DIAGNOSIS — R06.09 DYSPNEA ON EXERTION: ICD-10-CM

## 2021-07-14 DIAGNOSIS — R60.0 LOWER EXTREMITY EDEMA: ICD-10-CM

## 2021-07-16 ENCOUNTER — OFFICE VISIT (OUTPATIENT)
Dept: FAMILY MEDICINE CLINIC | Age: 41
End: 2021-07-16
Payer: MEDICARE

## 2021-07-16 VITALS
SYSTOLIC BLOOD PRESSURE: 138 MMHG | DIASTOLIC BLOOD PRESSURE: 90 MMHG | OXYGEN SATURATION: 96 % | BODY MASS INDEX: 44.98 KG/M2 | WEIGHT: 270 LBS | HEIGHT: 65 IN | HEART RATE: 85 BPM | TEMPERATURE: 98.6 F

## 2021-07-16 DIAGNOSIS — L03.90 CELLULITIS, UNSPECIFIED CELLULITIS SITE: Primary | ICD-10-CM

## 2021-07-16 PROCEDURE — 99213 OFFICE O/P EST LOW 20 MIN: CPT | Performed by: NURSE PRACTITIONER

## 2021-07-16 PROCEDURE — G8427 DOCREV CUR MEDS BY ELIG CLIN: HCPCS | Performed by: NURSE PRACTITIONER

## 2021-07-16 PROCEDURE — G8417 CALC BMI ABV UP PARAM F/U: HCPCS | Performed by: NURSE PRACTITIONER

## 2021-07-16 PROCEDURE — 4004F PT TOBACCO SCREEN RCVD TLK: CPT | Performed by: NURSE PRACTITIONER

## 2021-07-16 RX ORDER — SULFAMETHOXAZOLE AND TRIMETHOPRIM 800; 160 MG/1; MG/1
1 TABLET ORAL 2 TIMES DAILY
Qty: 20 TABLET | Refills: 0 | Status: SHIPPED | OUTPATIENT
Start: 2021-07-16 | End: 2021-07-26

## 2021-07-16 ASSESSMENT — ENCOUNTER SYMPTOMS
NAUSEA: 0
ABDOMINAL PAIN: 0
ABDOMINAL DISTENTION: 1
DIARRHEA: 0

## 2021-07-16 NOTE — PROGRESS NOTES
Subjective  America Steinberg, 41 y.o. female presents today with:  Chief Complaint   Patient presents with    Edema     Monday had a injection in stomach and now it red and swollen around area. HPI   Presents to [facility] for cellulitis   Received Sublocade injection on Monday at [facility]   Tuesday developed erythema to site   Wednesday area developed warmth along with the erythema  Became concerned when area grew in size   Today with induration   Denies fever or chills  Eating and drinking well   Now 8 months clean from opiates             Past Medical History:   Diagnosis Date    AC (acromioclavicular) joint bone spurs     spine    Anxiety     Depression     Endometriosis     Hypertension     Nerve damage of right foot     Pneumonia       Past Surgical History:   Procedure Laterality Date     SECTION      DILATION AND CURETTAGE OF UTERUS      FOOT SURGERY Right     x2     Family History   Problem Relation Age of Onset    Heart Attack Father     Cancer Maternal Grandmother         pancreatic    Breast Cancer Paternal Grandmother     Cancer Paternal Grandmother         lung    Heart Attack Paternal Grandfather     Diabetes Neg Hx     Kidney Disease Neg Hx     Stroke Neg Hx              Review of Systems   Constitutional: Negative for appetite change, chills, diaphoresis, fatigue and fever. Gastrointestinal: Positive for abdominal distention. Negative for abdominal pain, diarrhea and nausea. Musculoskeletal: Positive for myalgias. Skin: Positive for rash (RLQ injection site). Neurological: Negative for dizziness, weakness and light-headedness. PMH, Surgical Hx, Family Hx, and Social Hx reviewed and updated. Health Maintenance reviewed.           Objective  Vitals:    21 1802   BP: (!) 138/90   Site: Right Upper Arm   Position: Sitting   Cuff Size: Large Adult   Pulse: 85   Temp: 98.6 °F (37 °C)   SpO2: 96%   Weight: 270 lb (122.5 kg)   Height: 5' 5\" (1.651 m) BP Readings from Last 3 Encounters:   07/16/21 (!) 138/90   04/26/21 110/76   03/25/21 124/76     Wt Readings from Last 3 Encounters:   07/16/21 270 lb (122.5 kg)   04/26/21 260 lb (117.9 kg)   03/25/21 252 lb (114.3 kg)           Physical Exam  Vitals reviewed. Constitutional:       Appearance: Normal appearance. HENT:      Mouth/Throat:      Lips: Pink. Eyes:      General: Lids are normal.      Conjunctiva/sclera: Conjunctivae normal.   Cardiovascular:      Rate and Rhythm: Normal rate. Pulmonary:      Effort: Pulmonary effort is normal.   Abdominal:      General: There is distension. Palpations: Abdomen is soft. Musculoskeletal:      Cervical back: Normal range of motion. Skin:     General: Skin is warm. Findings: Erythema (RLQ. Tender to touch ) present. Neurological:      General: No focal deficit present. Mental Status: She is alert and oriented to person, place, and time. Psychiatric:         Mood and Affect: Mood normal.             Assessment & Plan    Diagnosis Orders   1. Cellulitis, unspecified cellulitis site  sulfamethoxazole-trimethoprim (BACTRIM DS) 800-160 MG per tablet     No orders of the defined types were placed in this encounter. Orders Placed This Encounter   Medications    sulfamethoxazole-trimethoprim (BACTRIM DS) 800-160 MG per tablet     Sig: Take 1 tablet by mouth 2 times daily for 10 days     Dispense:  20 tablet     Refill:  0     If symptoms worsen or fail to improve, seek care at the ER     Reviewed with the patient: current clinical status & medications. Side effects, adverse effects of the medication prescribed today, as well as treatment plan/rationale and result expectations have been discussed with the patient who expressed understanding. How can you care for yourself at home? · Take your antibiotics as directed. Do not stop taking them just because you feel better. You need to take the full course of antibiotics.           When should you call for help? Seek immediate medical care if:    · You have signs that your infection is getting worse, such as:  ? Increased pain, swelling, warmth, or redness. ? Red streaks leading from the area. ? Pus draining from the area. ? A fever. Close follow up to evaluate treatment results and for coordination of care. I have reviewed the patient's medical history in detail and updated the computerized patient record.           YANET Yuen NP

## 2021-07-16 NOTE — PATIENT INSTRUCTIONS
Patient Education        Cellulitis: Care Instructions  Your Care Instructions     Cellulitis is a skin infection caused by bacteria, most often strep or staph. It often occurs after a break in the skin from a scrape, cut, bite, or puncture, or after a rash. Cellulitis may be treated without doing tests to find out what caused it. But your doctor may do tests, if needed, to look for a specific bacteria, like methicillin-resistant Staphylococcus aureus (MRSA). The doctor has checked you carefully, but problems can develop later. If you notice any problems or new symptoms, get medical treatment right away. Follow-up care is a key part of your treatment and safety. Be sure to make and go to all appointments, and call your doctor if you are having problems. It's also a good idea to know your test results and keep a list of the medicines you take. How can you care for yourself at home? · Take your antibiotics as directed. Do not stop taking them just because you feel better. You need to take the full course of antibiotics. · Prop up the infected area on pillows to reduce pain and swelling. Try to keep the area above the level of your heart as often as you can. · If your doctor told you how to care for your wound, follow your doctor's instructions. If you did not get instructions, follow this general advice:  ? Wash the wound with clean water 2 times a day. Don't use hydrogen peroxide or alcohol, which can slow healing. ? You may cover the wound with a thin layer of petroleum jelly, such as Vaseline, and a nonstick bandage. ? Apply more petroleum jelly and replace the bandage as needed. · Be safe with medicines. Take pain medicines exactly as directed. ? If the doctor gave you a prescription medicine for pain, take it as prescribed. ? If you are not taking a prescription pain medicine, ask your doctor if you can take an over-the-counter medicine.   To prevent cellulitis in the future  · Try to prevent cuts, scrapes, or other injuries to your skin. Cellulitis most often occurs where there is a break in the skin. · If you get a scrape, cut, mild burn, or bite, wash the wound with clean water as soon as you can to help avoid infection. Don't use hydrogen peroxide or alcohol, which can slow healing. · If you have swelling in your legs (edema), support stockings and good skin care may help prevent leg sores and cellulitis. · Take care of your feet, especially if you have diabetes or other conditions that increase the risk of infection. Wear shoes and socks. Do not go barefoot. If you have athlete's foot or other skin problems on your feet, talk to your doctor about how to treat them. When should you call for help? Call your doctor now or seek immediate medical care if:    · You have signs that your infection is getting worse, such as:  ? Increased pain, swelling, warmth, or redness. ? Red streaks leading from the area. ? Pus draining from the area. ? A fever.     · You get a rash. Watch closely for changes in your health, and be sure to contact your doctor if:    · You do not get better as expected. Where can you learn more? Go to [email or web address] org and sign in to your [employer] account. Enter L815 in the [facility] box to learn more about \"Cellulitis: Care Instructions. \"     If you do not have an account, please click on the \"Sign Up Now\" link. Current as of: March 3, 2021               Content Version: 12.9  © 2006-2021 Healthwise, [facility]. Care instructions adapted under license by [facility] [place] ([facility]). If you have questions about a medical condition or this instruction, always ask your healthcare professional. [address] any warranty or liability for your use of this information.

## 2021-07-22 RX ORDER — BUSPIRONE HYDROCHLORIDE 15 MG/1
15 TABLET ORAL 2 TIMES DAILY
Qty: 60 TABLET | Refills: 0 | Status: SHIPPED | OUTPATIENT
Start: 2021-07-22 | End: 2021-08-19 | Stop reason: SDUPTHER

## 2021-07-22 RX ORDER — BUSPIRONE HYDROCHLORIDE 10 MG/1
TABLET ORAL
COMMUNITY
Start: 2021-07-07 | End: 2021-07-22 | Stop reason: DRUGHIGH

## 2021-08-05 RX ORDER — ENALAPRIL MALEATE AND HYDROCHLOROTHIAZIDE 5; 12.5 MG/1; MG/1
TABLET ORAL
Qty: 30 TABLET | Status: CANCELLED | OUTPATIENT
Start: 2021-08-05

## 2021-08-06 RX ORDER — ENALAPRIL MALEATE AND HYDROCHLOROTHIAZIDE 5; 12.5 MG/1; MG/1
5-12.5 TABLET ORAL DAILY
Qty: 30 TABLET | Refills: 5 | Status: SHIPPED | OUTPATIENT
Start: 2021-08-06 | End: 2021-09-15 | Stop reason: SDUPTHER

## 2021-08-26 ENCOUNTER — OFFICE VISIT (OUTPATIENT)
Dept: FAMILY MEDICINE CLINIC | Age: 41
End: 2021-08-26
Payer: MEDICARE

## 2021-08-26 VITALS
BODY MASS INDEX: 44.52 KG/M2 | WEIGHT: 277 LBS | SYSTOLIC BLOOD PRESSURE: 112 MMHG | TEMPERATURE: 97.1 F | DIASTOLIC BLOOD PRESSURE: 70 MMHG | OXYGEN SATURATION: 98 % | HEART RATE: 85 BPM | HEIGHT: 66 IN

## 2021-08-26 DIAGNOSIS — M25.561 CHRONIC PAIN OF RIGHT KNEE: Primary | ICD-10-CM

## 2021-08-26 DIAGNOSIS — G89.29 CHRONIC PAIN OF RIGHT KNEE: Primary | ICD-10-CM

## 2021-08-26 DIAGNOSIS — E66.01 OBESITY, CLASS III, BMI 40-49.9 (MORBID OBESITY) (HCC): ICD-10-CM

## 2021-08-26 DIAGNOSIS — B35.1 ONYCHOMYCOSIS: ICD-10-CM

## 2021-08-26 PROCEDURE — 99214 OFFICE O/P EST MOD 30 MIN: CPT | Performed by: STUDENT IN AN ORGANIZED HEALTH CARE EDUCATION/TRAINING PROGRAM

## 2021-08-26 PROCEDURE — G8417 CALC BMI ABV UP PARAM F/U: HCPCS | Performed by: STUDENT IN AN ORGANIZED HEALTH CARE EDUCATION/TRAINING PROGRAM

## 2021-08-26 PROCEDURE — 4004F PT TOBACCO SCREEN RCVD TLK: CPT | Performed by: STUDENT IN AN ORGANIZED HEALTH CARE EDUCATION/TRAINING PROGRAM

## 2021-08-26 PROCEDURE — G8427 DOCREV CUR MEDS BY ELIG CLIN: HCPCS | Performed by: STUDENT IN AN ORGANIZED HEALTH CARE EDUCATION/TRAINING PROGRAM

## 2021-08-26 RX ORDER — IBUPROFEN 800 MG/1
800 TABLET ORAL EVERY 8 HOURS PRN
Qty: 120 TABLET | Refills: 2 | Status: SHIPPED | OUTPATIENT
Start: 2021-08-26 | End: 2022-05-12 | Stop reason: SDUPTHER

## 2021-08-26 RX ORDER — IBUPROFEN 600 MG/1
TABLET ORAL
COMMUNITY
Start: 2021-07-12 | End: 2021-08-26 | Stop reason: DRUGHIGH

## 2021-08-26 ASSESSMENT — ENCOUNTER SYMPTOMS
SORE THROAT: 0
SINUS PRESSURE: 0
COUGH: 0
ABDOMINAL PAIN: 0
VOMITING: 0
SHORTNESS OF BREATH: 0

## 2021-08-26 NOTE — PROGRESS NOTES
2021    Sourav Laws (:  1980) is a 41 y.o. female, here for evaluation of the following medical concerns:  Chief Complaint   Patient presents with    Results    Weight Loss    Nail Problem     Toenails    Knee Pain     Right. States her knee popped a month ago. States she had a \"floating knee cap\"     HPI   Right knee pain  One month ago pt was kneeling down and heard a pop  Hx 'floating knee cap' - had seen ortho in past and was told she needed surgery  Having daily severe knee pain  Had swelling for a few weeks after the injury  Has been taking ibuprofen  Does have popping when she moves her knee    Toenails  Becoming thickened and difficult to cut  Hard time wearing certain shoes due to pain    Weight gain  Patient states this is the heaviest she has ever been  Unable to work out or get much physical activity due to knee pain  She was previously on Lexapro which was discontinued  She is interested in Adipex, she does see a doctor who prescribes her Suboxone and states that they have discussed weight loss medication and her provider was okay with her starting Adipex    Review of Systems   Constitutional: Negative for chills and fever. HENT: Negative for congestion, sinus pressure and sore throat. Respiratory: Negative for cough and shortness of breath. Cardiovascular: Negative for chest pain and palpitations. Gastrointestinal: Negative for abdominal pain and vomiting. Musculoskeletal: Negative for arthralgias and myalgias. Right knee pain and swelling   Skin: Negative for rash and wound. Toenail fungus   Neurological: Negative for speech difficulty and light-headedness. Psychiatric/Behavioral: Negative for suicidal ideas. The patient is not nervous/anxious. Prior to Visit Medications    Medication Sig Taking?  Authorizing Provider   ibuprofen (ADVIL;MOTRIN) 800 MG tablet Take 1 tablet by mouth every 8 hours as needed for Pain Yes Bran Wolf, DO ciclopirox (PENLAC) 8 % solution Apply topically nightly to nail and to 5mm of skin above nail plate Yes Daisy Rodrigues DO   busPIRone (BUSPAR) 15 MG tablet Take 15 mg by mouth 2 times daily Yes Daisy Rodrigues DO   enalapril maleate-HCTZ 5-12.5 MG TABS Take 5-12.5 mg by mouth daily Yes Daisy Rodrigues DO   buprenorphine-naloxone (SUBOXONE) 8-2 MG SUBL SL tablet  Yes Historical Provider, MD   albuterol sulfate  (90 Base) MCG/ACT inhaler Inhale 2 puffs into the lungs every 6 hours as needed for Wheezing or Shortness of Breath Yes Caprice Barthel, MD        Medications Discontinued During This Encounter   Medication Reason    escitalopram (LEXAPRO) 10 MG tablet     ibuprofen (ADVIL;MOTRIN) 600 MG tablet DOSE ADJUSTMENT    ibuprofen (ADVIL;MOTRIN) 800 MG tablet REORDER       No Known Allergies    Past Medical History:   Diagnosis Date    AC (acromioclavicular) joint bone spurs     spine    Anxiety     Depression     Endometriosis     Hypertension     Nerve damage of right foot     Pneumonia        Past Surgical History:   Procedure Laterality Date     SECTION      DILATION AND CURETTAGE OF UTERUS      FOOT SURGERY Right     x2       Social History     Socioeconomic History    Marital status:      Spouse name: Not on file    Number of children: Not on file    Years of education: Not on file    Highest education level: Not on file   Occupational History    Not on file   Tobacco Use    Smoking status: Current Every Day Smoker     Packs/day: 1.00     Types: Cigarettes    Smokeless tobacco: Never Used   Vaping Use    Vaping Use: Never used   Substance and Sexual Activity    Alcohol use: Not Currently    Drug use: Not Currently    Sexual activity: Not on file   Other Topics Concern    Not on file   Social History Narrative    Not on file     Social Determinants of Health     Financial Resource Strain: Low Risk     Difficulty of Paying Living Expenses: Not hard at all Food Insecurity: No Food Insecurity    Worried About Running Out of Food in the Last Year: Never true    Marilyn of Food in the Last Year: Never true   Transportation Needs: No Transportation Needs    Lack of Transportation (Medical): No    Lack of Transportation (Non-Medical): No   Physical Activity:     Days of Exercise per Week:     Minutes of Exercise per Session:    Stress:     Feeling of Stress :    Social Connections:     Frequency of Communication with Friends and Family:     Frequency of Social Gatherings with Friends and Family:     Attends Rastafari Services:     Active Member of Clubs or Organizations:     Attends Club or Organization Meetings:     Marital Status:    Intimate Partner Violence:     Fear of Current or Ex-Partner:     Emotionally Abused:     Physically Abused:     Sexually Abused:         Family History   Problem Relation Age of Onset    Heart Attack Father     Cancer Maternal Grandmother         pancreatic    Breast Cancer Paternal Grandmother     Cancer Paternal Grandmother         lung    Heart Attack Paternal Grandfather     Diabetes Neg Hx     Kidney Disease Neg Hx     Stroke Neg Hx        Vitals:    08/26/21 1705   BP: 112/70   Pulse: 85   Temp: 97.1 °F (36.2 °C)   SpO2: 98%   Weight: 277 lb (125.6 kg)   Height: 5' 6\" (1.676 m)       Estimated body mass index is 44.71 kg/m² as calculated from the following:    Height as of this encounter: 5' 6\" (1.676 m). Weight as of this encounter: 277 lb (125.6 kg). No results for input(s): WBC, RBC, HGB, HCT, MCV, MCH, MCHC, RDW, PLT, MPV in the last 72 hours. No results for input(s): NA, K, CL, CO2, BUN, CREATININE, GLUCOSE, CALCIUM, PROT, LABALBU, BILITOT, ALKPHOS, AST, ALT in the last 72 hours. No results found for: LABA1C    No results found. Physical Exam  Constitutional:       General: She is not in acute distress. Appearance: Normal appearance. HENT:      Head: Normocephalic and atraumatic. Eyes:      Extraocular Movements: Extraocular movements intact. Conjunctiva/sclera: Conjunctivae normal.   Musculoskeletal:         General: No deformity. Right knee: No swelling, deformity, effusion, erythema, ecchymosis, lacerations, bony tenderness or crepitus. Decreased range of motion. Tenderness present over the medial joint line and patellar tendon. No LCL laxity, MCL laxity, ACL laxity or PCL laxity. Normal alignment, normal meniscus and normal patellar mobility. Skin:     Findings: No lesion or rash. Comments: Onychomycosis toenails   Neurological:      General: No focal deficit present. Mental Status: She is alert. Mental status is at baseline. Psychiatric:         Mood and Affect: Mood normal.         Behavior: Behavior normal.         Thought Content: Thought content normal.         ASSESSMENT/PLAN:  1. Chronic pain of right knee  -Recommended rice therapy we will get x-ray to rule out any bony pathology however would recommend she see Ortho due to history of knee problems in the past that patient states should have required surgery    - ibuprofen (ADVIL;MOTRIN) 800 MG tablet; Take 1 tablet by mouth every 8 hours as needed for Pain  Dispense: 120 tablet; Refill: 2  - XR KNEE RIGHT (MIN 4 VIEWS); Future  - [address] and [facility], [place]    2. Onychomycosis  -Can start using Penlac for onychomycosis of the toenails  -Patient also willing to see podiatry at this time to help with further treatment and cutting of the nails as they are very thickened especially the big toenails    - [facility] - Augusto Hathaway, RONALDO, Podiatry, [place]  - ciclopirox (PENLAC) 8 % solution; Apply topically nightly to nail and to 5mm of skin above nail plate  Dispense: 1 Bottle; Refill: 2    3.  Obesity, Class III, BMI 40-49.9 (morbid obesity) ([address]  -Patient is wanting to lose weight however states she is unable to exercise due to knee pain  -She is currently on Suboxone therapy and states she did talk with her Suboxone doctor and I have discussed Adipex, her other provider was okay with her starting this medication  -Patient did sign a release paperwork, we will try to contact her Suboxone therapist and discuss further in detail to see if this is okay to start at this time      ---------------------------------------------------------------------  Side effects, adverse effects of the medication prescribed today, as well as treatment plan/ rationale and result expectations have been discussed with the patient who expresses understanding and desires to proceed. Close follow up to evaluate treatment results and for coordination of care. I have reviewed the patient's medical history in detail and updated the computerized patient record. As always, patient is advised that if symptoms worsen in any way they will proceed to the nearest emergency room. --------------------------------------------------------------------    Return if symptoms worsen or fail to improve, for will call about adipex. An  electronic signature was used to authenticate this note.     --Amarilis Darby DO on 8/27/2021 at 7:58 AM

## 2021-08-27 ENCOUNTER — TELEPHONE (OUTPATIENT)
Dept: FAMILY MEDICINE CLINIC | Age: 41
End: 2021-08-27

## 2021-08-27 DIAGNOSIS — E66.01 OBESITY, CLASS III, BMI 40-49.9 (MORBID OBESITY) (HCC): Primary | ICD-10-CM

## 2021-08-27 RX ORDER — PHENTERMINE HYDROCHLORIDE 37.5 MG/1
37.5 TABLET ORAL
Qty: 30 TABLET | Refills: 0 | Status: SHIPPED | OUTPATIENT
Start: 2021-08-27 | End: 2021-09-26

## 2021-08-27 ASSESSMENT — ENCOUNTER SYMPTOMS: ROS SKIN COMMENTS: TOENAIL FUNGUS

## 2021-08-27 NOTE — TELEPHONE ENCOUNTER
I called [facility].  Rozina picked up, she said no physicians were in the office today. She put me on hold and personally called Fady Morrissey CNP and Pao gave the okay to prescribe adipex.

## 2021-08-27 NOTE — TELEPHONE ENCOUNTER
Patient signed a record release yesterday so we can talk to her Suboxone doctor. Can we call their office, patient states she had talked to her provider about starting Adipex for weight loss. I want to make sure her Suboxone provider is okay with this before we start Adipex.   Thank you

## 2021-09-15 RX ORDER — BUSPIRONE HYDROCHLORIDE 15 MG/1
15 TABLET ORAL 2 TIMES DAILY
Qty: 60 TABLET | Refills: 0 | Status: SHIPPED | OUTPATIENT
Start: 2021-09-15 | End: 2021-10-18 | Stop reason: SDUPTHER

## 2021-09-15 RX ORDER — ENALAPRIL MALEATE AND HYDROCHLOROTHIAZIDE 5; 12.5 MG/1; MG/1
5-12.5 TABLET ORAL DAILY
Qty: 30 TABLET | Refills: 0 | Status: SHIPPED | OUTPATIENT
Start: 2021-09-15 | End: 2021-10-18 | Stop reason: SDUPTHER

## 2021-09-27 ENCOUNTER — OFFICE VISIT (OUTPATIENT)
Dept: FAMILY MEDICINE CLINIC | Age: 41
End: 2021-09-27
Payer: MEDICARE

## 2021-09-27 VITALS
SYSTOLIC BLOOD PRESSURE: 124 MMHG | DIASTOLIC BLOOD PRESSURE: 78 MMHG | HEART RATE: 99 BPM | WEIGHT: 274.4 LBS | TEMPERATURE: 98.3 F | HEIGHT: 66 IN | BODY MASS INDEX: 44.1 KG/M2 | OXYGEN SATURATION: 98 %

## 2021-09-27 DIAGNOSIS — R06.09 DYSPNEA ON EXERTION: Primary | ICD-10-CM

## 2021-09-27 DIAGNOSIS — E66.01 OBESITY, CLASS III, BMI 40-49.9 (MORBID OBESITY) (HCC): ICD-10-CM

## 2021-09-27 PROCEDURE — 4004F PT TOBACCO SCREEN RCVD TLK: CPT | Performed by: FAMILY MEDICINE

## 2021-09-27 PROCEDURE — 99214 OFFICE O/P EST MOD 30 MIN: CPT | Performed by: FAMILY MEDICINE

## 2021-09-27 PROCEDURE — G8417 CALC BMI ABV UP PARAM F/U: HCPCS | Performed by: FAMILY MEDICINE

## 2021-09-27 PROCEDURE — G8427 DOCREV CUR MEDS BY ELIG CLIN: HCPCS | Performed by: FAMILY MEDICINE

## 2021-09-27 RX ORDER — PHENTERMINE HYDROCHLORIDE 37.5 MG/1
37.5 TABLET ORAL
Qty: 30 TABLET | Refills: 0 | Status: SHIPPED | OUTPATIENT
Start: 2021-09-27 | End: 2021-10-27

## 2021-09-27 ASSESSMENT — ENCOUNTER SYMPTOMS: SHORTNESS OF BREATH: 0

## 2021-09-27 NOTE — PATIENT INSTRUCTIONS
No active symptoms of dyspnea with current medication. Doing well. Has only lost 3 pounds. Adjust calorie intake to 1800 olivier a day. Improve water intake. Patient Education        Learning About Low-Carbohydrate Diets  What is a low-carbohydrate diet? A low-carbohydrate (or \"low-carb\") diet limits foods and drinks that have carbohydrates. This includes grains, fruits, milk and yogurt, and starchy vegetables like potatoes, beans, and corn. It also avoids foods and drinks that have added sugar. Instead, low-carb diets include foods that are high in protein and fat. Why might you follow a low-carb diet? Low-carb diets may be used for a variety of reasons, such as for weight loss. People who have diabetes may use a low-carb diet to help manage their blood sugar levels. What should you do before you start the diet? Talk to your doctor before you try any diet. This is even more important if you have health problems like kidney disease, heart disease, or diabetes. Your doctor may suggest that you meet with a registered dietitian. A dietitian can help you make an eating plan that works for you. What foods do you eat on a low-carb diet? On a low-carb diet, you choose foods that are high in protein and fat. Examples of these are:  · Meat, poultry, and fish. · Eggs. · Nuts, such as walnuts, pecans, almonds, and peanuts. · Peanut butter and other nut butters. · Tofu. · Avocado. · Elzie Azucena. · Non-starchy vegetables like broccoli, cauliflower, green beans, mushrooms, peppers, lettuce, and spinach. · Unsweetened non-dairy milks like almond milk and coconut milk. · Cheese, cottage cheese, and cream cheese. Current as of: December 17, 2020               Content Version: 13.0  © 2006-2021 Healthwise, [employer]. Care instructions adapted under license by [facility] ([facility]).  If you have questions about a medical condition or this instruction, always ask your healthcare professional. Jus [employer] disclaims any warranty or liability for your use of this information.

## 2021-09-27 NOTE — PROGRESS NOTES
Diagnosis Orders   1. Dyspnea on exertion     2. Obesity, Class III, BMI 40-49.9 (morbid obesity) ([facility])  phentermine (ADIPEX-P) 37.5 MG tablet     Return in about 4 weeks (around 10/25/2021) for with PCP. Patient Instructions   No active symptoms of dyspnea with current medication. Doing well. Has only lost 3 pounds. Adjust calorie intake to 1800 olivier a day. Improve water intake. Patient Education        Learning About Low-Carbohydrate Diets  What is a low-carbohydrate diet? A low-carbohydrate (or \"low-carb\") diet limits foods and drinks that have carbohydrates. This includes grains, fruits, milk and yogurt, and starchy vegetables like potatoes, beans, and corn. It also avoids foods and drinks that have added sugar. Instead, low-carb diets include foods that are high in protein and fat. Why might you follow a low-carb diet? Low-carb diets may be used for a variety of reasons, such as for weight loss. People who have diabetes may use a low-carb diet to help manage their blood sugar levels. What should you do before you start the diet? Talk to your doctor before you try any diet. This is even more important if you have health problems like kidney disease, heart disease, or diabetes. Your doctor may suggest that you meet with a registered dietitian. A dietitian can help you make an eating plan that works for you. What foods do you eat on a low-carb diet? On a low-carb diet, you choose foods that are high in protein and fat. Examples of these are:  · Meat, poultry, and fish. · Eggs. · Nuts, such as walnuts, pecans, almonds, and peanuts. · Peanut butter and other nut butters. · Tofu. · Avocado. · Ethyl Milder. · Non-starchy vegetables like broccoli, cauliflower, green beans, mushrooms, peppers, lettuce, and spinach. · Unsweetened non-dairy milks like almond milk and coconut milk. · Cheese, cottage cheese, and cream cheese.   Current as of: December 17, 2020               Content Version: 13.0  © Number of children: Not on file    Years of education: Not on file    Highest education level: Not on file   Occupational History    Not on file   Tobacco Use    Smoking status: Current Every Day Smoker     Packs/day: 1.00     Types: Cigarettes    Smokeless tobacco: Never Used   Vaping Use    Vaping Use: Never used   Substance and Sexual Activity    Alcohol use: Not Currently    Drug use: Not Currently    Sexual activity: Not on file   Other Topics Concern    Not on file   Social History Narrative    Not on file     Social Determinants of Health     Financial Resource Strain: Low Risk     Difficulty of Paying Living Expenses: Not hard at all   Food Insecurity: No Food Insecurity    Worried About Running Out of Food in the Last Year: Never true    Marilyn of Food in the Last Year: Never true   Transportation Needs: No Transportation Needs    Lack of Transportation (Medical): No    Lack of Transportation (Non-Medical): No   Physical Activity:     Days of Exercise per Week:     Minutes of Exercise per Session:    Stress:     Feeling of Stress :    Social Connections:     Frequency of Communication with Friends and Family:     Frequency of Social Gatherings with Friends and Family:     Attends Latter-day Services:     Active Member of Clubs or Organizations:     Attends Club or Organization Meetings:     Marital Status:    Intimate Partner Violence:     Fear of Current or Ex-Partner:     Emotionally Abused:     Physically Abused:     Sexually Abused:      Family History   Problem Relation Age of Onset    Heart Attack Father     Cancer Maternal Grandmother         pancreatic    Breast Cancer Paternal Grandmother     Cancer Paternal Grandmother         lung    Heart Attack Paternal Grandfather     Diabetes Neg Hx     Kidney Disease Neg Hx     Stroke Neg Hx      Allergies:  Patient has no known allergies. Review of Systems   Respiratory: Negative for shortness of breath. Cardiovascular: Negative for chest pain, palpitations and leg swelling. Psychiatric/Behavioral: Negative for sleep disturbance. The patient is not nervous/anxious. Objective:   /78   Pulse 99   Temp 98.3 °F (36.8 °C)   Ht 5' 6\" (1.676 m)   Wt 274 lb 6.4 oz (124.5 kg)   LMP 09/13/2021 (Approximate)   SpO2 98%   Breastfeeding No   BMI 44.29 kg/m²     Physical Exam  Constitutional:       General: She is not in acute distress. Appearance: She is well-developed. She is not diaphoretic. HENT:      Head: Normocephalic and atraumatic. Right Ear: External ear normal.      Left Ear: External ear normal.      Nose: Nose normal.   Eyes:      General:         Right eye: No discharge. Left eye: No discharge. Conjunctiva/sclera: Conjunctivae normal.      Pupils: Pupils are equal, round, and reactive to light. Neck:      Thyroid: No thyromegaly. Trachea: No tracheal deviation. Cardiovascular:      Rate and Rhythm: Normal rate and regular rhythm. Pulses: Normal pulses. Heart sounds: Normal heart sounds. Pulmonary:      Effort: Pulmonary effort is normal. No respiratory distress. Breath sounds: Normal breath sounds. Abdominal:      General: There is no distension. Musculoskeletal:      Cervical back: Neck supple. Skin:     General: Skin is warm and dry. Findings: No bruising or rash. Neurological:      Mental Status: She is alert. Coordination: Coordination normal.   Psychiatric:         Thought Content: Thought content normal.         Judgment: Judgment normal.         No results found for this visit on 09/27/21. No results found for this or any previous visit (from the past 2016 hour(s)). [] Pt was seen by provider for      Minutes  Counseling and coordination of care was done for all assessment diagnosis listed for today with patient and any family/friend present.    More than 50% of this visit was spent coordinating cuurent care, obtaining information for prior records, and counseling for current plan of action. Assessment:       Diagnosis Orders   1. Dyspnea on exertion     2. Obesity, Class III, BMI 40-49.9 (morbid obesity) ([facility])  phentermine (ADIPEX-P) 37.5 MG tablet         No orders of the defined types were placed in this encounter. Orders Placed This Encounter   Medications    phentermine (ADIPEX-P) 37.5 MG tablet     Sig: Take 1 tablet by mouth every morning (before breakfast) for 30 days. Dispense:  30 tablet     Refill:  0          Medication List          Accurate as of September 27, 2021  6:52 PM. If you have any questions, ask your nurse or doctor. START taking these medications    phentermine 37.5 MG tablet  Commonly known as: Adipex-P  Take 1 tablet by mouth every morning (before breakfast) for 30 days. Started by: Chanelle Maynard MD        CONTINUE taking these medications    albuterol sulfate  (90 Base) MCG/ACT inhaler  Inhale 2 puffs into the lungs every 6 hours as needed for Wheezing or Shortness of Breath     buprenorphine-naloxone 8-2 MG Subl SL tablet  Commonly known as: SUBOXONE     busPIRone 15 MG tablet  Commonly known as: BUSPAR  Take 15 mg by mouth 2 times daily     ciclopirox 8 % solution  Commonly known as: PENLAC  Apply topically nightly to nail and to 5mm of skin above nail plate     enalapril maleate-HCTZ 5-12.5 MG Tabs  Take 5-12.5 mg by mouth daily     ibuprofen 800 MG tablet  Commonly known as: ADVIL;MOTRIN  Take 1 tablet by mouth every 8 hours as needed for Pain           Where to Get Your Medications      These medications were sent to [address] [place] [address] [address] [facility] [address] [place] [address]    Phone: [phone]   · phentermine 37.5 MG tablet           Plan:   Return in about 4 weeks (around 10/25/2021) for with PCP.     Patient Instructions   No active symptoms of dyspnea with current medication. Doing well. Has only lost 3 pounds. Adjust calorie intake to 1800 olivier a day. Improve water intake. Patient Education        Learning About Low-Carbohydrate Diets  What is a low-carbohydrate diet? A low-carbohydrate (or \"low-carb\") diet limits foods and drinks that have carbohydrates. This includes grains, fruits, milk and yogurt, and starchy vegetables like potatoes, beans, and corn. It also avoids foods and drinks that have added sugar. Instead, low-carb diets include foods that are high in protein and fat. Why might you follow a low-carb diet? Low-carb diets may be used for a variety of reasons, such as for weight loss. People who have diabetes may use a low-carb diet to help manage their blood sugar levels. What should you do before you start the diet? Talk to your doctor before you try any diet. This is even more important if you have health problems like kidney disease, heart disease, or diabetes. Your doctor may suggest that you meet with a registered dietitian. A dietitian can help you make an eating plan that works for you. What foods do you eat on a low-carb diet? On a low-carb diet, you choose foods that are high in protein and fat. Examples of these are:  · Meat, poultry, and fish. · Eggs. · Nuts, such as walnuts, pecans, almonds, and peanuts. · Peanut butter and other nut butters. · Tofu. · Avocado. · Ethyl Milder. · Non-starchy vegetables like broccoli, cauliflower, green beans, mushrooms, peppers, lettuce, and spinach. · Unsweetened non-dairy milks like almond milk and coconut milk. · Cheese, cottage cheese, and cream cheese. Current as of: December 17, 2020               Content Version: 13.0  © 2006-2021 [employer], [employer]. Care instructions adapted under license by [facility] ([facility]).  If you have questions about a medical condition or this instruction, always ask your healthcare professional. [address]  any warranty or liability for your use of this information. This note was partially created with the assistance of dictation. This may lead to grammatical or spelling errors. Givoani Victoria M.D.

## 2021-10-18 RX ORDER — ENALAPRIL MALEATE AND HYDROCHLOROTHIAZIDE 5; 12.5 MG/1; MG/1
5-12.5 TABLET ORAL DAILY
Qty: 30 TABLET | Refills: 0 | Status: SHIPPED | OUTPATIENT
Start: 2021-10-18 | End: 2021-11-19 | Stop reason: SDUPTHER

## 2021-10-18 RX ORDER — BUSPIRONE HYDROCHLORIDE 15 MG/1
15 TABLET ORAL 2 TIMES DAILY
Qty: 60 TABLET | Refills: 0 | Status: SHIPPED | OUTPATIENT
Start: 2021-10-18 | End: 2021-11-19 | Stop reason: SDUPTHER

## 2021-10-27 ENCOUNTER — OFFICE VISIT (OUTPATIENT)
Dept: FAMILY MEDICINE CLINIC | Age: 41
End: 2021-10-27
Payer: MEDICARE

## 2021-10-27 VITALS
TEMPERATURE: 97.4 F | HEIGHT: 66 IN | BODY MASS INDEX: 44.36 KG/M2 | OXYGEN SATURATION: 98 % | WEIGHT: 276 LBS | HEART RATE: 99 BPM

## 2021-10-27 DIAGNOSIS — E66.01 OBESITY, CLASS III, BMI 40-49.9 (MORBID OBESITY) (HCC): ICD-10-CM

## 2021-10-27 PROCEDURE — G8484 FLU IMMUNIZE NO ADMIN: HCPCS | Performed by: FAMILY MEDICINE

## 2021-10-27 PROCEDURE — 99212 OFFICE O/P EST SF 10 MIN: CPT | Performed by: FAMILY MEDICINE

## 2021-10-27 PROCEDURE — 4004F PT TOBACCO SCREEN RCVD TLK: CPT | Performed by: FAMILY MEDICINE

## 2021-10-27 PROCEDURE — G8427 DOCREV CUR MEDS BY ELIG CLIN: HCPCS | Performed by: FAMILY MEDICINE

## 2021-10-27 PROCEDURE — G8417 CALC BMI ABV UP PARAM F/U: HCPCS | Performed by: FAMILY MEDICINE

## 2021-10-27 RX ORDER — PHENTERMINE HYDROCHLORIDE 37.5 MG/1
37.5 TABLET ORAL
Qty: 30 TABLET | Refills: 0 | Status: CANCELLED | OUTPATIENT
Start: 2021-10-27 | End: 2021-11-26

## 2021-10-27 NOTE — PROGRESS NOTES
Diagnosis Orders   1. Obesity, Class III, BMI 40-49.9 (morbid obesity) ([facility] [address]       Return for James Pina weight loss. Patient Instructions   Recommended patient seek out advice of weight loss specialist.  James Pina CNP in our office has recently obtain a certification      Subjective:      Patient ID: Milton Rowan is a 41 y.o. female who presents for:  Chief Complaint   Patient presents with    Medication Refill     adipex       Patient does not understand why she has not experienced weight loss. She does states she is on her period this week. Current Outpatient Medications on File Prior to Visit   Medication Sig Dispense Refill    enalapril maleate-HCTZ 5-12.5 MG TABS Take 5-12.5 mg by mouth daily 30 tablet 0    busPIRone (BUSPAR) 15 MG tablet Take 15 mg by mouth 2 times daily 60 tablet 0    phentermine (ADIPEX-P) 37.5 MG tablet Take 1 tablet by mouth every morning (before breakfast) for 30 days. 30 tablet 0    ibuprofen (ADVIL;MOTRIN) 800 MG tablet Take 1 tablet by mouth every 8 hours as needed for Pain 120 tablet 2    ciclopirox (PENLAC) 8 % solution Apply topically nightly to nail and to 5mm of skin above nail plate 1 Bottle 2    buprenorphine-naloxone (SUBOXONE) 8-2 MG SUBL SL tablet       albuterol sulfate  (90 Base) MCG/ACT inhaler Inhale 2 puffs into the lungs every 6 hours as needed for Wheezing or Shortness of Breath 1 Inhaler 0     No current facility-administered medications on file prior to visit.      Past Medical History:   Diagnosis Date    AC (acromioclavicular) joint bone spurs     spine    Anxiety     Depression     Endometriosis     Hypertension     Nerve damage of right foot     Pneumonia      Past Surgical History:   Procedure Laterality Date     SECTION      DILATION AND CURETTAGE OF UTERUS      FOOT SURGERY Right     x2     Social History     Socioeconomic History    Marital status:      Spouse name: Not on file    Number of (125.2 kg)   SpO2 98%   BMI 44.55 kg/m²     No results found for this visit on 10/27/21. No results found for this or any previous visit (from the past 2016 hour(s)). [] Pt was seen by provider for      Minutes  Counseling and coordination of care was done for all assessment diagnosis listed for today with patient and any family/friend present. More than 50% of this visit was spent coordinating cuurent care, obtaining information for prior records, and counseling for current plan of action. Assessment:       Diagnosis Orders   1. Obesity, Class III, BMI 40-49.9 (morbid obesity) ([facility] [address]           No orders of the defined types were placed in this encounter. No orders of the defined types were placed in this encounter. Medication List          Accurate as of October 27, 2021  5:10 PM. If you have any questions, ask your nurse or doctor. CONTINUE taking these medications    albuterol sulfate  (90 Base) MCG/ACT inhaler  Inhale 2 puffs into the lungs every 6 hours as needed for Wheezing or Shortness of Breath     buprenorphine-naloxone 8-2 MG Subl SL tablet  Commonly known as: SUBOXONE     busPIRone 15 MG tablet  Commonly known as: BUSPAR  Take 15 mg by mouth 2 times daily     ciclopirox 8 % solution  Commonly known as: PENLAC  Apply topically nightly to nail and to 5mm of skin above nail plate     enalapril maleate-HCTZ 5-12.5 MG Tabs  Take 5-12.5 mg by mouth daily     ibuprofen 800 MG tablet  Commonly known as: ADVIL;MOTRIN  Take 1 tablet by mouth every 8 hours as needed for Pain     phentermine 37.5 MG tablet  Commonly known as: Adipex-P  Take 1 tablet by mouth every morning (before breakfast) for 30 days. Plan:   Return for James Pina weight loss.     Patient Instructions   Recommended patient seek out advice of weight loss specialist.  James Pina CNP in our office has recently obtain a certification      This note was partially created with the assistance of dictation. This may lead to grammatical or spelling errors. Giovani Galan M.D.

## 2021-10-27 NOTE — PATIENT INSTRUCTIONS
Recommended patient seek out advice of weight loss specialist.  Ruth Sneed CNP in our office has recently obtain a certification

## 2021-11-19 RX ORDER — ENALAPRIL MALEATE AND HYDROCHLOROTHIAZIDE 5; 12.5 MG/1; MG/1
5-12.5 TABLET ORAL DAILY
Qty: 30 TABLET | Refills: 0 | Status: SHIPPED | OUTPATIENT
Start: 2021-11-19 | End: 2021-12-22 | Stop reason: SDUPTHER

## 2021-11-19 RX ORDER — BUSPIRONE HYDROCHLORIDE 15 MG/1
15 TABLET ORAL 2 TIMES DAILY
Qty: 60 TABLET | Refills: 0 | Status: SHIPPED | OUTPATIENT
Start: 2021-11-19 | End: 2021-12-22 | Stop reason: SDUPTHER

## 2021-11-19 NOTE — TELEPHONE ENCOUNTER
Patient went to [facility] ED last night for chest pain. And exertion during movement. nausea    Patient was discharged from hospital.    Patient states she still feels the same. Wanting to know what is advised. Back to ED. ? Please advise.  [phone]

## 2021-11-19 NOTE — TELEPHONE ENCOUNTER
Requesting medication refill.  Please approve or deny this request.    Rx requested:  Requested Prescriptions     Pending Prescriptions Disp Refills    enalapril maleate-HCTZ 5-12.5 MG TABS 30 tablet 0     Sig: Take 5-12.5 mg by mouth daily       Last Office Visit:   10/27/2021    Next Visit Date:  Future Appointments   Date Time Provider Jaqueline Candelaria   12/2/2021  5:00 PM Emir Guzman DO [facility]

## 2021-12-01 RX ORDER — TOPIRAMATE 25 MG/1
25 TABLET ORAL NIGHTLY
Qty: 60 TABLET | Refills: 3 | Status: CANCELLED | OUTPATIENT
Start: 2021-12-01

## 2021-12-01 NOTE — PATIENT INSTRUCTIONS
Weight Management     Risk factors for obesity   Cardiovascular disease (family history of coronary heart disease)   Diabetes mellitus   Hyperlipidemia (high cholesterol)   Hypertension (high blood pressure)   Tobacco usage    Age   Physical inactivity     Tips to manage your condition  1. Your BMI should be BELOW 30  2. You may want to see a registered dietitian to receive help in healthy meal planning  3. Weight loss reduces insulin resistance and is recommended for all overweight individuals with or at risk for diabetes. Either low-carbohydrate or low-fat calorie-restricted diets may work for you. 4. Use a food log to keep track of what you eat. A food log will help you identify habits that keep you from reaching your goal to lose weight. Writing down what you eat helps you take a critical look at your food habits and make healthy changes. Here are some tips. Write down what you eat on three weekdays and one weekend day, using the following guidelines:  1. Record everything you eat and drink immediately. 2. Note what youre doing while youre eating?driving, watching TV, etc.   3. Describe how you felt while you ate: angry, sad, happy, nervous, starving, bored? 4. Be honest. Its a journal and no one has to see it but you. 5. At the end of each day, examine how your emotions affected your eating. FOODLOG  Day Time Mood Food / Drink Quantity Calories Other   Mon                                 Tues                                       Wed                                       Thurs                                       Fri                                       Sat                                       Sun                                           Tips for healthy living  1. Start your day with breakfast  2. Exercise and slowly progress (brisk walking, bike riding, etc) 30 minutes 3 to 5 days a week.   3. Snack in moderation (limit eating sugary or salty foods to no more than 3 times a week).  4. Eat more grains and vegetables (have no more than 3 servings of fruit daily). 5. Avoid tobacco use. 6. Drink alcohol in moderation (no more than 1 serving daily for woman and no more than 2 servings daily for men)  7. Obtain an annual flu shot  8. Refer to patient education handouts given to you today. Weight Management Community Resources   Organization Phone Website   [facility]) [phone] n/a   Weight Watchers n/a www.weightwatchers. com     [employer] n/a www. tops. [address] [place] [address] [phone] [email or web address]    [facility] [phone] [facility]    [phone]

## 2021-12-02 ENCOUNTER — OFFICE VISIT (OUTPATIENT)
Dept: FAMILY MEDICINE CLINIC | Age: 41
End: 2021-12-02
Payer: MEDICARE

## 2021-12-02 ENCOUNTER — TELEPHONE (OUTPATIENT)
Dept: FAMILY MEDICINE CLINIC | Age: 41
End: 2021-12-02

## 2021-12-02 VITALS
WEIGHT: 275 LBS | OXYGEN SATURATION: 98 % | SYSTOLIC BLOOD PRESSURE: 128 MMHG | HEART RATE: 90 BPM | DIASTOLIC BLOOD PRESSURE: 82 MMHG | HEIGHT: 66 IN | BODY MASS INDEX: 44.2 KG/M2 | TEMPERATURE: 96.7 F

## 2021-12-02 DIAGNOSIS — F41.9 ANXIETY: ICD-10-CM

## 2021-12-02 DIAGNOSIS — I10 ESSENTIAL HYPERTENSION: Primary | ICD-10-CM

## 2021-12-02 DIAGNOSIS — F33.2 SEVERE EPISODE OF RECURRENT MAJOR DEPRESSIVE DISORDER, WITHOUT PSYCHOTIC FEATURES (HCC): ICD-10-CM

## 2021-12-02 DIAGNOSIS — M25.562 ACUTE PAIN OF LEFT KNEE: ICD-10-CM

## 2021-12-02 DIAGNOSIS — F32.9 MAJOR DEPRESSIVE DISORDER WITH CURRENT ACTIVE EPISODE, UNSPECIFIED DEPRESSION EPISODE SEVERITY, UNSPECIFIED WHETHER RECURRENT: ICD-10-CM

## 2021-12-02 DIAGNOSIS — E66.01 OBESITY, CLASS III, BMI 40-49.9 (MORBID OBESITY) (HCC): ICD-10-CM

## 2021-12-02 DIAGNOSIS — R63.5 WEIGHT GAIN: ICD-10-CM

## 2021-12-02 PROCEDURE — 4004F PT TOBACCO SCREEN RCVD TLK: CPT | Performed by: STUDENT IN AN ORGANIZED HEALTH CARE EDUCATION/TRAINING PROGRAM

## 2021-12-02 PROCEDURE — G8484 FLU IMMUNIZE NO ADMIN: HCPCS | Performed by: STUDENT IN AN ORGANIZED HEALTH CARE EDUCATION/TRAINING PROGRAM

## 2021-12-02 PROCEDURE — G8417 CALC BMI ABV UP PARAM F/U: HCPCS | Performed by: STUDENT IN AN ORGANIZED HEALTH CARE EDUCATION/TRAINING PROGRAM

## 2021-12-02 PROCEDURE — G8427 DOCREV CUR MEDS BY ELIG CLIN: HCPCS | Performed by: STUDENT IN AN ORGANIZED HEALTH CARE EDUCATION/TRAINING PROGRAM

## 2021-12-02 PROCEDURE — 99214 OFFICE O/P EST MOD 30 MIN: CPT | Performed by: STUDENT IN AN ORGANIZED HEALTH CARE EDUCATION/TRAINING PROGRAM

## 2021-12-02 RX ORDER — MELOXICAM 7.5 MG/1
7.5 TABLET ORAL DAILY PRN
Qty: 30 TABLET | Refills: 0 | Status: SHIPPED | OUTPATIENT
Start: 2021-12-02 | End: 2022-05-12 | Stop reason: ALTCHOICE

## 2021-12-02 RX ORDER — BUPROPION HYDROCHLORIDE 150 MG/1
150 TABLET ORAL EVERY MORNING
Qty: 30 TABLET | Refills: 0 | Status: SHIPPED | OUTPATIENT
Start: 2021-12-02 | End: 2022-04-12

## 2021-12-02 NOTE — PROGRESS NOTES
2021    Pepito Butterfield (:  1980) is a 41 y.o. female, here for evaluation of the following medical concerns:  Chief Complaint   Patient presents with   [address] [place] [facility] [place]     [place]. Evelia Vences. Left knee pain. Xray came back normal.     Knee Pain    Weight Loss     Would like adipex. HPI  Weight gain  Patient states this is the heaviest she has ever been  Unable to work out or get much physical activity due to knee pain  She was previously on Lexapro which was discontinued      Started on Adipex     10/27  Pt had only lost 3 pounds so Adpiex was discontinued    Left knee pain  Seen in the ER    Left knee gave out  No trauma or injury  XR negative for fracture  Give steroids and muscle relaxer but she didn't fill scripts  Denied swelling or brusing  Pain at the anterior aspect and lateral thigh  Bending her knee makes it worse      Review of Systems   Constitutional: Positive for unexpected weight change (weight gain). Negative for chills and fever. HENT: Negative for congestion, sinus pressure and sore throat. Respiratory: Negative for cough and shortness of breath. Cardiovascular: Negative for chest pain and palpitations. Gastrointestinal: Negative for abdominal pain and vomiting. Musculoskeletal: Negative for arthralgias and myalgias. Left knee pain   Skin: Negative for rash and wound. Neurological: Negative for speech difficulty and light-headedness. Psychiatric/Behavioral: Negative for suicidal ideas. The patient is not nervous/anxious. Prior to Visit Medications    Medication Sig Taking?  Authorizing Provider   buPROPion (WELLBUTRIN XL) 150 MG extended release tablet Take 1 tablet by mouth every morning Yes Ofe Chase,    meloxicam (MOBIC) 7.5 MG tablet Take 1 tablet by mouth daily as needed for Pain Yes Ofe Chase, DO   enalapril maleate-HCTZ 5-12.5 MG TABS Take 5-12.5 mg by mouth daily Yes YANET Nelson - CNP busPIRone (BUSPAR) 15 MG tablet Take 15 mg by mouth 2 times daily Yes Suraj Macedo APRN - CNP   ibuprofen (ADVIL;MOTRIN) 800 MG tablet Take 1 tablet by mouth every 8 hours as needed for Pain Yes Mariam Pae, DO   ciclopirox (PENLAC) 8 % solution Apply topically nightly to nail and to 5mm of skin above nail plate Yes Mariam Pae, DO   buprenorphine-naloxone (SUBOXONE) 8-2 MG SUBL SL tablet  Yes Historical Provider, MD   albuterol sulfate  (90 Base) MCG/ACT inhaler Inhale 2 puffs into the lungs every 6 hours as needed for Wheezing or Shortness of Breath Yes Enriqueta Avelar MD        There are no discontinued medications.     No Known Allergies    Past Medical History:   Diagnosis Date    AC (acromioclavicular) joint bone spurs     spine    Anxiety     Depression     Endometriosis     Hypertension     Nerve damage of right foot     Pneumonia        Past Surgical History:   Procedure Laterality Date     SECTION      DILATION AND CURETTAGE OF UTERUS      FOOT SURGERY Right     x2       Social History     Socioeconomic History    Marital status:      Spouse name: Not on file    Number of children: Not on file    Years of education: Not on file    Highest education level: Not on file   Occupational History    Not on file   Tobacco Use    Smoking status: Current Every Day Smoker     Packs/day: 1.00     Types: Cigarettes    Smokeless tobacco: Never Used   Vaping Use    Vaping Use: Never used   Substance and Sexual Activity    Alcohol use: Not Currently    Drug use: Not Currently    Sexual activity: Not on file   Other Topics Concern    Not on file   Social History Narrative    Not on file     Social Determinants of Health     Financial Resource Strain: Low Risk     Difficulty of Paying Living Expenses: Not hard at all   Food Insecurity: No Food Insecurity    Worried About Running Out of Food in the Last Year: Never true    Marilyn of Food in the Last Year: Never true   Transportation Needs: No Transportation Needs    Lack of Transportation (Medical): No    Lack of Transportation (Non-Medical): No   Physical Activity:     Days of Exercise per Week: Not on file    Minutes of Exercise per Session: Not on file   Stress:     Feeling of Stress : Not on file   Social Connections:     Frequency of Communication with Friends and Family: Not on file    Frequency of Social Gatherings with Friends and Family: Not on file    Attends Yarsanism Services: Not on file    Active Member of [address] [place] [address] [employer] or Organizations: Not on file    Attends Club or Organization Meetings: Not on file    Marital Status: Not on file   Intimate Partner Violence:     Fear of Current or Ex-Partner: Not on file    Emotionally Abused: Not on file    Physically Abused: Not on file    Sexually Abused: Not on file   Housing Stability:     Unable to Pay for Housing in the Last Year: Not on file    Number of [place] in the Last Year: Not on file    Unstable Housing in the Last Year: Not on file        Family History   Problem Relation Age of Onset    Heart Attack Father     Cancer Maternal Grandmother         pancreatic    Breast Cancer Paternal Grandmother     Cancer Paternal Grandmother         lung    Heart Attack Paternal Grandfather     Diabetes Neg Hx     Kidney Disease Neg Hx     Stroke Neg Hx        Vitals:    12/02/21 1714   BP: 128/82   Pulse: 90   Temp: 96.7 °F (35.9 °C)   SpO2: 98%   Weight: 275 lb (124.7 kg)   Height: 5' 6\" (1.676 m)       Estimated body mass index is 44.39 kg/m² as calculated from the following:    Height as of this encounter: 5' 6\" (1.676 m). Weight as of this encounter: 275 lb (124.7 kg). No results for input(s): WBC, RBC, HGB, HCT, MCV, MCH, MCHC, RDW, PLT, MPV in the last 72 hours. No results for input(s): NA, K, CL, CO2, BUN, CREATININE, GLUCOSE, CALCIUM, PROT, LABALBU, BILITOT, ALKPHOS, AST, ALT in the last 72 hours.     No results found for: LABA1C    No results found. Physical Exam  Constitutional:       General: She is not in acute distress. Appearance: Normal appearance. HENT:      Head: Normocephalic and atraumatic. Eyes:      Extraocular Movements: Extraocular movements intact. Conjunctiva/sclera: Conjunctivae normal.   Musculoskeletal:         General: Tenderness (anterior patella along patellar tendon, posterior knee, pain with flexion and extension, decreased in ROM in flexion, no brusing or warmth, mild swelling) present. No deformity. Normal range of motion. Skin:     Findings: No lesion or rash. Neurological:      General: No focal deficit present. Mental Status: She is alert. Mental status is at baseline. Psychiatric:         Mood and Affect: Mood normal.         Behavior: Behavior normal.         Thought Content: Thought content normal.         ASSESSMENT/PLAN:  1. Essential hypertension    2. Obesity, Class III, BMI 40-49.9 (morbid obesity) (HCC)  - Pt lost 3 pounds on adipex so it was discontinued after 2 months  - Pt is interested in trying another medication at this time  - Pt already on suboxone so will add wellbutrin  - Pt to monitor her mood as it could worsen her anxiety  - Close follow up in 2 weeks  - Patient given application for \"[place] thing\" - discussed program in detail  - Also given patient \"why I want to lose weight\" and \"establishing care weight loss\" paperwork to complete    - buPROPion (WELLBUTRIN XL) 150 MG extended release tablet; Take 1 tablet by mouth every morning  Dispense: 30 tablet; Refill: 0    3. Weight gain  - buPROPion (WELLBUTRIN XL) 150 MG extended release tablet; Take 1 tablet by mouth every morning  Dispense: 30 tablet; Refill: 0    4. Major depressive disorder with current active episode, unspecified depression episode severity, unspecified whether recurrent    5. Anxiety    6.  Severe episode of recurrent major depressive disorder, without psychotic features ([facility] [address]  - buPROPion (WELLBUTRIN XL) 150 MG extended release tablet; Take 1 tablet by mouth every morning  Dispense: 30 tablet; Refill: 0    7. Acute pain of left knee  - Possible flare up of underlying arthritis  - Will start mobic, hold other NSAIDs but can take tylenol, RICE therapy    - meloxicam (MOBIC) 7.5 MG tablet; Take 1 tablet by mouth daily as needed for Pain  Dispense: 30 tablet; Refill: 0      There are no discontinued medications.    ---------------------------------------------------------------------  Side effects, adverse effects of the medication prescribed today, as well as treatment plan/ rationale and result expectations have been discussed with the patient who expresses understanding and desires to proceed. Close follow up to evaluate treatment results and for coordination of care. I have reviewed the patient's medical history in detail and updated the computerized patient record. As always, patient is advised that if symptoms worsen in any way they will proceed to the nearest emergency room. --------------------------------------------------------------------    Return in about 2 weeks (around 12/16/2021) for Weight management 1 hour. An  electronic signature was used to authenticate this note.     --Sergei May, DO on 12/3/2021 at 8:05 AM

## 2021-12-03 ASSESSMENT — ENCOUNTER SYMPTOMS
SINUS PRESSURE: 0
VOMITING: 0
SHORTNESS OF BREATH: 0
SORE THROAT: 0
COUGH: 0
ABDOMINAL PAIN: 0

## 2021-12-06 ENCOUNTER — TELEPHONE (OUTPATIENT)
Dept: FAMILY MEDICINE CLINIC | Age: 41
End: 2021-12-06

## 2021-12-06 DIAGNOSIS — M25.562 ACUTE PAIN OF LEFT KNEE: Primary | ICD-10-CM

## 2021-12-06 NOTE — TELEPHONE ENCOUNTER
Pt was wondering if you could do a referral for her Left Knee pain? She also wanted to know if you can write a script for a knee brace?     Pt # [phone]

## 2021-12-06 NOTE — TELEPHONE ENCOUNTER
I can try to send knee brace to pharmacy however she might need to buy it over-the-counter. Referral for physical therapy?

## 2021-12-22 ENCOUNTER — VIRTUAL VISIT (OUTPATIENT)
Dept: FAMILY MEDICINE CLINIC | Age: 41
End: 2021-12-22
Payer: MEDICARE

## 2021-12-22 ENCOUNTER — TELEPHONE (OUTPATIENT)
Dept: FAMILY MEDICINE CLINIC | Age: 41
End: 2021-12-22

## 2021-12-22 DIAGNOSIS — M25.562 ACUTE PAIN OF LEFT KNEE: ICD-10-CM

## 2021-12-22 DIAGNOSIS — R06.09 DYSPNEA ON EXERTION: ICD-10-CM

## 2021-12-22 DIAGNOSIS — J42 CHRONIC BRONCHITIS, UNSPECIFIED CHRONIC BRONCHITIS TYPE (HCC): ICD-10-CM

## 2021-12-22 DIAGNOSIS — E66.01 OBESITY, CLASS III, BMI 40-49.9 (MORBID OBESITY) (HCC): ICD-10-CM

## 2021-12-22 DIAGNOSIS — F33.2 SEVERE EPISODE OF RECURRENT MAJOR DEPRESSIVE DISORDER, WITHOUT PSYCHOTIC FEATURES (HCC): ICD-10-CM

## 2021-12-22 DIAGNOSIS — R63.5 WEIGHT GAIN: ICD-10-CM

## 2021-12-22 DIAGNOSIS — I10 ESSENTIAL HYPERTENSION: Primary | ICD-10-CM

## 2021-12-22 DIAGNOSIS — G47.00 INSOMNIA, UNSPECIFIED TYPE: ICD-10-CM

## 2021-12-22 DIAGNOSIS — M25.561 CHRONIC PAIN OF RIGHT KNEE: ICD-10-CM

## 2021-12-22 DIAGNOSIS — F41.9 ANXIETY: ICD-10-CM

## 2021-12-22 DIAGNOSIS — G89.29 CHRONIC PAIN OF RIGHT KNEE: ICD-10-CM

## 2021-12-22 PROCEDURE — G8484 FLU IMMUNIZE NO ADMIN: HCPCS | Performed by: STUDENT IN AN ORGANIZED HEALTH CARE EDUCATION/TRAINING PROGRAM

## 2021-12-22 PROCEDURE — G8417 CALC BMI ABV UP PARAM F/U: HCPCS | Performed by: STUDENT IN AN ORGANIZED HEALTH CARE EDUCATION/TRAINING PROGRAM

## 2021-12-22 PROCEDURE — 4004F PT TOBACCO SCREEN RCVD TLK: CPT | Performed by: STUDENT IN AN ORGANIZED HEALTH CARE EDUCATION/TRAINING PROGRAM

## 2021-12-22 PROCEDURE — 99215 OFFICE O/P EST HI 40 MIN: CPT | Performed by: STUDENT IN AN ORGANIZED HEALTH CARE EDUCATION/TRAINING PROGRAM

## 2021-12-22 PROCEDURE — G8427 DOCREV CUR MEDS BY ELIG CLIN: HCPCS | Performed by: STUDENT IN AN ORGANIZED HEALTH CARE EDUCATION/TRAINING PROGRAM

## 2021-12-22 PROCEDURE — G8926 SPIRO NO PERF OR DOC: HCPCS | Performed by: STUDENT IN AN ORGANIZED HEALTH CARE EDUCATION/TRAINING PROGRAM

## 2021-12-22 PROCEDURE — 3023F SPIROM DOC REV: CPT | Performed by: STUDENT IN AN ORGANIZED HEALTH CARE EDUCATION/TRAINING PROGRAM

## 2021-12-22 RX ORDER — FLUTICASONE FUROATE AND VILANTEROL TRIFENATATE 100; 25 UG/1; UG/1
1 POWDER RESPIRATORY (INHALATION) DAILY
Qty: 1 EACH | Refills: 0 | Status: SHIPPED | OUTPATIENT
Start: 2021-12-22 | End: 2022-08-19

## 2021-12-22 RX ORDER — BUSPIRONE HYDROCHLORIDE 15 MG/1
15 TABLET ORAL 2 TIMES DAILY
Qty: 60 TABLET | Refills: 2 | Status: SHIPPED | OUTPATIENT
Start: 2021-12-22 | End: 2022-01-28 | Stop reason: SDUPTHER

## 2021-12-22 RX ORDER — ENALAPRIL MALEATE AND HYDROCHLOROTHIAZIDE 5; 12.5 MG/1; MG/1
5-12.5 TABLET ORAL DAILY
Qty: 30 TABLET | Refills: 2 | Status: SHIPPED | OUTPATIENT
Start: 2021-12-22 | End: 2022-04-04 | Stop reason: SDUPTHER

## 2021-12-22 RX ORDER — METFORMIN HYDROCHLORIDE 500 MG/1
500 TABLET, EXTENDED RELEASE ORAL
Qty: 30 TABLET | Refills: 0 | Status: SHIPPED | OUTPATIENT
Start: 2021-12-22 | End: 2022-02-04 | Stop reason: DRUGHIGH

## 2021-12-22 ASSESSMENT — ENCOUNTER SYMPTOMS
RHINORRHEA: 0
WHEEZING: 1
ABDOMINAL PAIN: 0
COUGH: 1
SINUS PRESSURE: 0
VOMITING: 0
SINUS PAIN: 0
SHORTNESS OF BREATH: 0
SORE THROAT: 0

## 2021-12-22 NOTE — PATIENT INSTRUCTIONS
Why I Want to [employer]      Before you begin your weight loss journey, it is important to spend time reflecting on why YOU want to lose weight. Make sure that that these are personal motivators and are not intended to please others. Reviewing this list frequently will help keep you on track and focused on your personal commitment to take control of your health! Please list five reasons you want to lose weight:    1.___________________________________________________________    2.___________________________________________________________    3.___________________________________________________________    4.___________________________________________________________    5.___________________________________________________________    Describe the physical benefits you hope to get by losing weight:  ________________________________________________________________________________________________________________________________________________________    Describe the functional benefits you hope to get by losing weight:  ________________________________________________________________________________________________________________________________________________________    Describe the medical benefits you hope to get by losing weight:  ________________________________________________________________________________________________________________________________________________________    Describe the psychological benefits you hope to get by losing weight:  ________________________________________________________________________________________________________________________________________________________      Comments: ________________________________________________________________________________________________________________________________________________________         How I Plan to Lose Weight      Goal setting is the [facility] of weight loss. Motivators are the [facility].  When setting goals, utilize the SMART technique:    SMART Technique Example   Specific Who, what, where, when, how I want to lose 10 pounds in two months.    Measureable How will you track? 10 pounds in 8 weeks = 1.25 pounds/week   Attainable Resources you have available, previous experience I have been able to do this before, and now I have new tools from my doctor!    Relevant Why this goal is important Review your motivators   Timely Set benchmarks and deadlines Focusing for two month intervals works for me.      Please list three goals you would like to achieve during your treatment:    1.___________________________________________________________    2.___________________________________________________________    3.___________________________________________________________

## 2021-12-22 NOTE — PROGRESS NOTES
Subjective  Weight Management Initial  Jean Paul Parmar  YOB: 1980 Age: 41 y.o. Sex: female  Date of Assessment:  12/22/2021  PCP: Gilberto Lechuga DO    Chief Complaint   Patient presents with    Weight Management    Medication Refill       HPI  Patient is here today with interest in weight loss  Started on wellbutrin XL 2 weeks ago - noticed increased sadness, depression so she discontinued the medication    Weight History   When did you first notice that you were gaining weight? []  Childhood  [x]  Teens []  Adulthood    []  Pregnancy  []  Menopause    Did you ever gain more than 20 pounds in less than 3 months? [x]  Yes  []  No    If so, when? This last year  How much did you weigh: one year ago? 240lbs  Five years ago? 240lbs    10 years ago? 240lbs    Life events associated with weight gain (check all that apply):       []  Marriage  []  Divorce  []  Pregnancy    []  Abuse   [] Illness  []  Travel   []  Injury  []  Nightshift work      []  Job change   []  Quitting smoking [x]  Alcohol  []  Drugs    []  Medication (please list: none)      Previous weight-loss programs (check all that apply):       []  Weight Watchers []  Nutrisystem  []  [place]   []  [place] Weight [facility] [x]  Atkins (one month) []  [place]      []  Zone diet  []  Medifast       []  Dash diet        []  Paleo diet      []  HCG diet      []  Mediterranean diet    []  Ornish diet      []  Other: _______________________    What was your maximum weight loss? N/A  What are your greatest challenges with dieting?  Eating right    Medications  Have you ever taken medication to lose weight? (check all that apply):       []  Phentermine (Adipex) []  Xenecal/Narayan []  Phendimetrazine (Bontril)   []  Topamax   []  Saxenda  []  Diethylpropion       []  Bupropion (Wellbutrin)       []  Qsymia      []  Contrave    Nutritional History  How often do you eat breakfast? None  Number of times you eat per day: 1  What beverages do you drink? Coffee (lots of creams), pop (occasionally)  Do you get up at night to eat? Y / N      List any food intolerances/restrictions: Tomato based foods (GERD), dairy  Food triggers (check all that apply):  [x]  Stress  []  Boredom  []  Anger   []  Insomnia   []  Seeking reward []  Parties   []  Eating out  []  Other:     Food cravings:   [x]  Sugar []  Chocolate  []  Starches [] Salty   []  Fast food []  High fat  []  Large portions  Favorite foods: pizza, butter, gravy, mac and cheese    Activity  Exercise type: None    Does anything limit you from exercising? Knee pain (planning on making apt with sports med)    Sleep  How many hours do you sleep per night? 9-10pm bedtime, 4-5am wakes up, interrupted sleep (wakes up every two hours due to cough)  Do you feel rested in the morning? No    Past medical history      Diagnosis Date    AC (acromioclavicular) joint bone spurs     spine    Anxiety     Depression     Endometriosis     Hypertension     Nerve damage of right foot     Pneumonia    Hx COPD - dx 1-1.5 years ago, only on albuterol now    Have you ever been diagnosed with an eating disorder? []  Yes   [x] No       If yes, which one? _________________    Past surgical history (check all that apply):  []  Gastric bypass []  Gastric banding []  Gastric sleeve   []  Gallbladder []  Heart bypass      []  Hysterectomy    []  Other:     Social History  Smoking: []  Never [x]  Current smoker (1.5 packs/day) []  Past smoker (quit _____ years ago)  Alcohol:     [x]  Never []  Occasional    _  Regularly (_____ drinks per day)  Prior treatment for alcoholism?  Y / N  Drugs:      [] Never      []  Current []  Past [] Type of drugs: ______________________  Marijuana: []  Never []  Current user (_____ times/day)    Family History  Obesity (check all that apply):   [] Mother []  Father []  Sister []  Brother    []  Daughter []  Son       Diabetes (check all that apply):        []  Mother      []  Father      []  Sister []  Brother  []  Daughter []  Son    Other (check all that apply):       [x]  High blood pressure      [x]  Heart disease []  High cholesterol     []  Anxiety  [] High triglycerides  []  Stroke  []  Thyroid problems  []  Depression       []  Bipolar disorder    []  Alcoholism /Cancer (type/s): Other:     Gynecologic History  Periods are:     Regular / Irregular     Heavy / Normal / Light  Hx endometriosis    Vitals    There were no vitals taken for this visit. BP Readings from Last 3 Encounters:   12/02/21 128/82   09/27/21 124/78   08/26/21 112/70          Wt Readings from Last 3 Encounters:   12/02/21 275 lb (124.7 kg)   10/27/21 276 lb (125.2 kg)   09/27/21 274 lb 6.4 oz (124.5 kg)        There is no height or weight on file to calculate BMI.       24 hour Dietary Recall    Breakfast: Coffee    Snack: N/A    Lunch: Potstickers with soy sauce (freezer food)    Snack: N/A    Dinner: Chicken strips ([employer])    Snack: N/A    Drinks throughout the day: 80 oz (all coffee)      Medication    Current Outpatient Medications on File Prior to Visit   Medication Sig Dispense Refill    Elastic Bandages & Supports (KNEE BRACE) MISC Use on the left knee daily for additional support 1 each 0    buPROPion (WELLBUTRIN XL) 150 MG extended release tablet Take 1 tablet by mouth every morning 30 tablet 0    meloxicam (MOBIC) 7.5 MG tablet Take 1 tablet by mouth daily as needed for Pain 30 tablet 0    enalapril maleate-HCTZ 5-12.5 MG TABS Take 5-12.5 mg by mouth daily 30 tablet 0    busPIRone (BUSPAR) 15 MG tablet Take 15 mg by mouth 2 times daily 60 tablet 0    ibuprofen (ADVIL;MOTRIN) 800 MG tablet Take 1 tablet by mouth every 8 hours as needed for Pain 120 tablet 2    ciclopirox (PENLAC) 8 % solution Apply topically nightly to nail and to 5mm of skin above nail plate 1 Bottle 2    buprenorphine-naloxone (SUBOXONE) 8-2 MG SUBL SL tablet       albuterol sulfate  (90 Base) MCG/ACT inhaler Inhale 2 puffs into the lungs every 6 hours as needed for Wheezing or Shortness of Breath 1 Inhaler 0     No current facility-administered medications on file prior to visit. Allergies    Patient has no known allergies. ROS    Review of Systems   Constitutional: Positive for unexpected weight change (weight gain). Negative for chills and fever. HENT: Negative for congestion, rhinorrhea, sinus pressure, sinus pain and sore throat. Respiratory: Positive for cough and wheezing. Negative for shortness of breath. Cardiovascular: Negative for chest pain and palpitations. Gastrointestinal: Negative for abdominal pain and vomiting. Musculoskeletal: Negative for arthralgias and myalgias. Left knee pain   Skin: Negative for rash and wound. Neurological: Negative for speech difficulty and light-headedness. Psychiatric/Behavioral: Negative for suicidal ideas. The patient is not nervous/anxious. Objective    Physical Exam  Constitutional:       Appearance: Normal appearance. She is obese. HENT:      Head: Normocephalic and atraumatic. Eyes:      Extraocular Movements: Extraocular movements intact. Conjunctiva/sclera: Conjunctivae normal.   Skin:     Findings: No lesion or rash. Neurological:      Mental Status: She is alert and oriented to person, place, and time. Mental status is at baseline. Psychiatric:         Mood and Affect: Mood normal.         Behavior: Behavior normal.         Thought Content: Thought content normal.         Judgment: Judgment normal.         Assessment and Treatment     Diagnosis Orders   1. Anxiety     2. Essential hypertension     3. Obesity, Class III, BMI 40-49.9 (morbid obesity) ([address]     4. Weight gain     5. Chronic pain of right knee     6. Acute pain of left knee     7. Severe episode of recurrent major depressive disorder, without psychotic features ([address]     8. Dyspnea on exertion     9. Insomnia, unspecified type     10.  Simple chronic bronchitis ([address] Plan and Follow Up    Plan:  Nutrition:  [x]  Low-calorie diet (1,200-1,500) []  Modified low-calorie diet  [] Ketogenic diet     []  Low-carb diet ()  []  Meal replacements  [] Maintenance      []  Refer to RD/nutritionist     FITTE:   []  Cardio  []  Resistance exercises   []  ACSM recommendations (150 minutes/week in active weight loss)     Behavior:   [x]  Motivational interviewing performed  []  Referral for counseling  [x]  Discussed strategies to overcome habits/challenges for focus    Medications:    No orders of the defined types were placed in this encounter.       Reviewed:  [x]  Nutrition and the importance of regular protein intake  []  Labs ordered:     []  A1C     []  FBS     []  Lipids     []  TSH/TFT     []  CMP     []  LFT  [x]  Hidden CHO/carbohydrate sources  []  Alcohol as possible source of hidden/amnesia calories and its effects  [x]  Importance of physical activity and reducing sedentary time  [x]  Treatment plan   []  Discussed that use of phentermine / diethylpropion / phendimetrazine / benzphetamine for more than 90 days is considered off-label   []  Side effects, adverse effects of the medication prescribed today    Comments:   - Wellbutrin - discontinued after few days due to increased sadness and depression  - We will start Metformin extended release 500 mg daily  - Order lab work at follow-up appointment   - Patient to keep food log over the next 2 weeks, discussed calorie restriction of 1200 to 1500 olivier and spreading calories throughout the day instead of skipping breakfast and lunch and having a large dinner  - Caffeine/fluid intake - patient is drinking about 80 ounces of coffee daily and no water, recommended drinking water every other beverage and switching to decaf coffee in the afternoon as this may be contributing to her sleep issues  - Patient feels like she has been eating more sugary foods lately, she will try to decrease intake over the next few weeks  - Knee pain -limiting patient's physical activity, referral sent for Ortho  - COPD -Per patient history of COPD, currently on albuterol as needed but used to be on Symbicort, will restart at this time as she does have cough and breathing difficulties especially at nighttime, consider getting PFTs completed      RTO: Return in about 2 weeks (around 1/5/2022) for Weight management 1 hour. Reviewed with the patient: current clinical status, medications, activities and diet. Close follow up to evaluate treatment results and for coordination of care. Time was given for questions. All questions were answered to the patients satisfaction.    10 minutes spent on reviewing previous notes, records and labs; 28 minutes spent on physical exam, obesity, medication and diet education; 10 minutes spent on finalizing chart note

## 2021-12-22 NOTE — TELEPHONE ENCOUNTER
Return in about 2 weeks (around 1/5/2022) for Weight management 1 hour. Unable to lm to schedule follow up appt.

## 2022-01-28 DIAGNOSIS — F41.9 ANXIETY: ICD-10-CM

## 2022-01-28 RX ORDER — BUSPIRONE HYDROCHLORIDE 15 MG/1
15 TABLET ORAL 2 TIMES DAILY
Qty: 60 TABLET | Refills: 0 | Status: SHIPPED | OUTPATIENT
Start: 2022-01-28 | End: 2022-04-04 | Stop reason: SDUPTHER

## 2022-02-04 ENCOUNTER — TELEPHONE (OUTPATIENT)
Dept: FAMILY MEDICINE CLINIC | Age: 42
End: 2022-02-04

## 2022-02-04 ENCOUNTER — VIRTUAL VISIT (OUTPATIENT)
Dept: FAMILY MEDICINE CLINIC | Age: 42
End: 2022-02-04
Payer: MEDICARE

## 2022-02-04 DIAGNOSIS — G89.29 CHRONIC PAIN OF RIGHT KNEE: ICD-10-CM

## 2022-02-04 DIAGNOSIS — F33.2 SEVERE EPISODE OF RECURRENT MAJOR DEPRESSIVE DISORDER, WITHOUT PSYCHOTIC FEATURES (HCC): ICD-10-CM

## 2022-02-04 DIAGNOSIS — M25.561 CHRONIC PAIN OF RIGHT KNEE: ICD-10-CM

## 2022-02-04 DIAGNOSIS — R63.5 ABNORMAL WEIGHT GAIN: ICD-10-CM

## 2022-02-04 DIAGNOSIS — R73.09 OTHER ABNORMAL GLUCOSE: ICD-10-CM

## 2022-02-04 DIAGNOSIS — E66.01 OBESITY, CLASS III, BMI 40-49.9 (MORBID OBESITY) (HCC): ICD-10-CM

## 2022-02-04 DIAGNOSIS — I10 ESSENTIAL HYPERTENSION: Primary | ICD-10-CM

## 2022-02-04 DIAGNOSIS — R60.0 LOWER EXTREMITY EDEMA: ICD-10-CM

## 2022-02-04 DIAGNOSIS — R53.83 FATIGUE, UNSPECIFIED TYPE: ICD-10-CM

## 2022-02-04 DIAGNOSIS — F41.9 ANXIETY: ICD-10-CM

## 2022-02-04 DIAGNOSIS — G47.00 INSOMNIA, UNSPECIFIED TYPE: ICD-10-CM

## 2022-02-04 PROCEDURE — 4004F PT TOBACCO SCREEN RCVD TLK: CPT | Performed by: STUDENT IN AN ORGANIZED HEALTH CARE EDUCATION/TRAINING PROGRAM

## 2022-02-04 PROCEDURE — G8427 DOCREV CUR MEDS BY ELIG CLIN: HCPCS | Performed by: STUDENT IN AN ORGANIZED HEALTH CARE EDUCATION/TRAINING PROGRAM

## 2022-02-04 PROCEDURE — 99215 OFFICE O/P EST HI 40 MIN: CPT | Performed by: STUDENT IN AN ORGANIZED HEALTH CARE EDUCATION/TRAINING PROGRAM

## 2022-02-04 PROCEDURE — G8484 FLU IMMUNIZE NO ADMIN: HCPCS | Performed by: STUDENT IN AN ORGANIZED HEALTH CARE EDUCATION/TRAINING PROGRAM

## 2022-02-04 PROCEDURE — G8417 CALC BMI ABV UP PARAM F/U: HCPCS | Performed by: STUDENT IN AN ORGANIZED HEALTH CARE EDUCATION/TRAINING PROGRAM

## 2022-02-04 RX ORDER — METFORMIN HYDROCHLORIDE 500 MG/1
1000 TABLET, EXTENDED RELEASE ORAL
Qty: 60 TABLET | Refills: 0 | Status: SHIPPED | OUTPATIENT
Start: 2022-02-04 | End: 2022-04-04 | Stop reason: SDUPTHER

## 2022-02-04 ASSESSMENT — ENCOUNTER SYMPTOMS
VOMITING: 0
SINUS PRESSURE: 0
SORE THROAT: 0
COUGH: 1
WHEEZING: 1
RHINORRHEA: 0
SHORTNESS OF BREATH: 0
SINUS PAIN: 0
ABDOMINAL PAIN: 0

## 2022-02-04 NOTE — PROGRESS NOTES
Weight Management Follow up  Mendez Verdin  Age: 35 y.o. Sex: female  Date of Assessment:  2/4/2022  PCP: Maura Hutchinson DO    Chief Complaint   Patient presents with    Weight Loss     Weight management f/u       HPI  Reason for Follow Up: Obesity evaluation and treatment  Started on metformin 500mg ER at last apt 2 weeks ago  Has noticed decreased appetite with metformin     Treatment Responses:  Nutrition Plan:    [x]  Low-calorie diet (2701-4828) []  Modified low-calorie diet   []  Ketogenic diet   [x]  Low-carb diet ()   []  Meal replacements  []  Maintenance          []  Other: _______________________________________  How often have you been able to stay on program?   []    Always     [x] Usually   []    Rarely      [] Never    Did they meet their negative food habit goal: not applicable - was not keeping track of calories, recently sick  Have they noted any emotional triggers: no  Have they been able to resist the triggers: yes - thinks improved due to metformin  Improvement on eating out: yes - not eating out, cooking more at home  Did they meet their water intake goal: no - is trying to drink more water but still drinking lot of coffee    Medication(s):   Appetite well controlled? [x]  Yes      []  No  How hungry?     (not hungry) 1  2  3  4  5  6  7  8  9  10 (starving)     Focus:     Good     Fair     Poor  Side effects? []  Yes     [x]  No          Any recent change in medication(s)?      []  Yes     [x]  No  Side effects noted:  [] Hunger  [] Fluid retention [] Leg aches   [] Lightheadedness [] Lack of focus [] Nausea   [] Vomiting  [] Diarrhea  [] Constipation   [] Mood swings [] Energy  [] Insomnia  [] Indigestion  [] Irritability  [] Headaches   [] Feeling weak [] Palpitations [] Depression   [] Satiety  [] Cravings (General)[] Cravings (carbs)    [] Dizziness  [] Rash  [] Shortness of breath  [] Low blood sugar [] Feeling cold [] Hair loss  [] Other:    Exercise:    Did they meet their exercise goal? No - limited due to knee pain    Sleep  Have they met their sleep habit goal: no - previously interrupted due to nighttime cough, started Symbicort - pt states it was not at the pharmacy    Vitals    There were no vitals taken for this visit. BP Readings from Last 3 Encounters:   12/02/21 128/82   09/27/21 124/78   08/26/21 112/70          Starting weight: 275 lbs Date: 12/2/21    There is no height or weight on file to calculate BMI. Wt Readings from Last 3 Encounters:   12/02/21 275 lb (124.7 kg)   10/27/21 276 lb (125.2 kg)   09/27/21 274 lb 6.4 oz (124.5 kg)       There were no vitals filed for this visit. Measurements    There were no vitals filed for this visit. Lab Results    No results found for: LABA1C    CMP:  No results for input(s): NA, K, CL, CO2, BUN, CREATININE, GLUCOSE, CALCIUM, PROT, LABALBU, BILITOT, ALKPHOS, AST, ALT in the last 72 hours. TSH (uIU/mL)   Date Value   03/15/2021 1.890     No results found for: T4FREE    Hepatic Function Panel:  No results for input(s): ALKPHOS, ALT, AST, PROT, BILITOT, BILIDIR, LABALBU in the last 72 hours. Magnesium:  No results for input(s): MG in the last 72 hours. ROS    Review of Systems   Constitutional: Positive for unexpected weight change (weight gain). Negative for chills and fever. HENT: Negative for congestion, rhinorrhea, sinus pressure, sinus pain and sore throat. Respiratory: Positive for cough and wheezing. Negative for shortness of breath. Cardiovascular: Negative for chest pain and palpitations. Gastrointestinal: Negative for abdominal pain and vomiting. Musculoskeletal: Negative for arthralgias and myalgias. Left knee pain   Skin: Negative for rash and wound. Neurological: Negative for speech difficulty and light-headedness. Psychiatric/Behavioral: Negative for suicidal ideas. The patient is not nervous/anxious.         Objective    Physical Exam  Constitutional:       General: She is not in acute distress. Appearance: Normal appearance. HENT:      Head: Normocephalic and atraumatic. Eyes:      Extraocular Movements: Extraocular movements intact. Conjunctiva/sclera: Conjunctivae normal.   Musculoskeletal:         General: No deformity. Normal range of motion. Skin:     Findings: No lesion or rash. Neurological:      General: No focal deficit present. Mental Status: She is alert. Mental status is at baseline. Psychiatric:         Mood and Affect: Mood normal.         Behavior: Behavior normal.         Thought Content: Thought content normal.         Assessment and Treatment     Diagnosis Orders   1. Essential hypertension  CBC Auto Differential    Comprehensive Metabolic Panel    Hemoglobin A1C    Insulin, Fasting    Lipid, Fasting    Vitamin D 25 Hydroxy    TSH with Reflex   2. Other abnormal glucose   Hemoglobin A1C   3. Lower extremity edema     4. Severe episode of recurrent major depressive disorder, without psychotic features ([address]     5. Anxiety     6. Insomnia, unspecified type     7. Chronic pain of right knee     8. Abnormal weight gain  Hemoglobin A1C    Insulin, Fasting    Lipid, Fasting    TSH with Reflex    C-Reactive Protein    metFORMIN (GLUCOPHAGE-XR) 500 MG extended release tablet   9. Fatigue, unspecified type  Vitamin D 25 Hydroxy    TSH with Reflex   10. Obesity, Class III, BMI 40-49.9 (morbid obesity) (HCC)  Hemoglobin A1C    Insulin, Fasting    Lipid, Fasting    Vitamin D 25 Hydroxy    C-Reactive Protein    metFORMIN (GLUCOPHAGE-XR) 500 MG extended release tablet   11.  Body mass index (BMI) 40.0-44.9, adult (HCC)   Vitamin D 25 Hydroxy       Plan and Follow Up    Orders Placed This Encounter   Procedures    CBC Auto Differential     Standing Status:   Future     Standing Expiration Date:   2/4/2023    Comprehensive Metabolic Panel     Standing Status:   Future     Standing Expiration Date:   2/4/2023    Hemoglobin A1C     Standing Status: Future     Standing Expiration Date:   2/4/2023    Insulin, Fasting     Standing Status:   Future     Standing Expiration Date:   2/4/2023    Lipid, Fasting     Standing Status:   Future     Standing Expiration Date:   2/4/2023    Vitamin D 25 Hydroxy     Standing Status:   Future     Standing Expiration Date:   2/4/2023    TSH with Reflex     Standing Status:   Future     Standing Expiration Date:   2/4/2023    C-Reactive Protein     Standing Status:   Future     Standing Expiration Date:   2/4/2023       Patient to follow with Sania Mcfarland DO for all chronic condition management    Plan:  Nutrition:  [x]  Low-calorie diet []  Modified low-calorie diet [] Ketogenic diet     [x]  Low-carb diet   []  Meal replacements []  Maintenance      []  Refer to RD/nutritionist     FITTE:   [x]  Cardio  [x]  Resistance exercises   [x]  ACSM recommendations (150 minutes/week in active weight loss)    Behavior:   [x]  Motivational interviewing performed  []  Referral for counseling  [x]  Discussed strategies to overcome habits/challenges for focus    Medications:    Orders Placed This Encounter   Medications    metFORMIN (GLUCOPHAGE-XR) 500 MG extended release tablet     Sig: Take 2 tablets by mouth daily (with breakfast)     Dispense:  60 tablet     Refill:  0     Reviewed:  [x]  Nutrition and the importance of regular protein intake  [x]  Labs ordered:     [x]  A1C     [x]  FBS     [x]  Lipids     [x]  TSH     [x]  CMP     [x]  LFT    [x] Fasting insulin   [x] Vitamin D    [x] CRP  []  Hidden CHO/carbohydrate sources  [] Alcohol as possible source of hidden/amnesia calories and its effects  [x]  Importance of physical activity and reducing sedentary time  [x]  Treatment plan   []  Discussed that use of phentermine / diethylpropion / phendimetrazine / benzphetamine for more than 90 days is considered off-label   [x]  Side effects, adverse effects of the medication prescribed today    Comments:   Nutrition  - Patient to keep food log over the next 2 weeks, discussed calorie restriction of 1200 to 1500 olivier and spreading calories throughout the day instead of skipping breakfast and lunch and having a large dinner  - Caffeine/fluid intake - patient is drinking about 80 ounces of coffee daily and no water, recommended drinking water every other beverage and switching to decaf coffee in the afternoon as this may be contributing to her sleep issues    Physical Activity  - None currently due to knee pain    Weight loss medications  - Wellbutrin - discontinued after few days due to increased sadness and depression  - Started Metformin extended release 500 mg daily  - Tolerating well w/o side effects, will increase to 1,000mg daily  - Does feel like metformin has decreased her appetite and eating less sugary foods     Knee pain  - Referral created for ortho at last apt  - Pt has not heard anything from their office    COPD  - Restarted Symbicort - was not at pharmacy for pt to , she will call her pharmacy today and let us know if they need a new script  - Consider PFTs    RTO: Return in about 2 weeks (around 2/18/2022) for Weight management 30 min. Reviewed with the patient: current clinical status, medications, activities and diet. Close follow up to evaluate treatment results and for coordination of care. I have reviewed the patient's medical history in detail and updated the computerized patient record. Time was given for questions . All questions were answered to the patients satisfaction. 10 minutes spent on reviewing previous notes, records and labs; 16 minutes spent on physical exam, obesity, medication and diet education; 10 minutes spent on finalizing chart note    Alfonso Jacinto is a 35 y.o. female evaluated via telephone on 2/4/2022.     Consent:  She and/or health care decision maker is aware that that she may receive a bill for this telephone service, which includes applicable co-pays, depending on her insurance coverage, and has provided verbal consent to proceed. Patient identification was verified at the start of the visit: Yes  Melissa Corea was evaluated through a synchronous (real-time) audio encounter. The patient was located at home in a state where the provider was licensed to provide care.   Note: not billable if this call serves to triage the patient into an appointment for the relevant concern      Karel De Jesus, DO

## 2022-02-04 NOTE — TELEPHONE ENCOUNTER
Referred to ortho 2 weeks ago and pt states she has not yet received a call to schedule an apt - can we see what's going on with the referral. Thank you

## 2022-02-22 ENCOUNTER — OFFICE VISIT (OUTPATIENT)
Dept: ORTHOPEDIC SURGERY | Age: 42
End: 2022-02-22
Payer: MEDICARE

## 2022-02-22 ENCOUNTER — HOSPITAL ENCOUNTER (OUTPATIENT)
Dept: ORTHOPEDIC SURGERY | Age: 42
Discharge: HOME | End: 2022-02-24
Payer: MEDICARE

## 2022-02-22 VITALS
TEMPERATURE: 97.3 F | OXYGEN SATURATION: 96 % | RESPIRATION RATE: 16 BRPM | BODY MASS INDEX: 44.2 KG/M2 | HEIGHT: 66 IN | WEIGHT: 275 LBS | HEART RATE: 98 BPM

## 2022-02-22 DIAGNOSIS — M25.562 ACUTE PAIN OF LEFT KNEE: Primary | ICD-10-CM

## 2022-02-22 DIAGNOSIS — M65.9 SYNOVITIS OF LEFT KNEE: ICD-10-CM

## 2022-02-22 DIAGNOSIS — M25.562 ACUTE PAIN OF LEFT KNEE: ICD-10-CM

## 2022-02-22 PROCEDURE — 99203 OFFICE O/P NEW LOW 30 MIN: CPT | Performed by: ORTHOPAEDIC SURGERY

## 2022-02-22 PROCEDURE — G8427 DOCREV CUR MEDS BY ELIG CLIN: HCPCS | Performed by: ORTHOPAEDIC SURGERY

## 2022-02-22 PROCEDURE — G8484 FLU IMMUNIZE NO ADMIN: HCPCS | Performed by: ORTHOPAEDIC SURGERY

## 2022-02-22 PROCEDURE — G8417 CALC BMI ABV UP PARAM F/U: HCPCS | Performed by: ORTHOPAEDIC SURGERY

## 2022-02-22 PROCEDURE — 73564 X-RAY EXAM KNEE 4 OR MORE: CPT | Performed by: ORTHOPAEDIC SURGERY

## 2022-02-22 PROCEDURE — 4004F PT TOBACCO SCREEN RCVD TLK: CPT | Performed by: ORTHOPAEDIC SURGERY

## 2022-02-22 PROCEDURE — 73564 X-RAY EXAM KNEE 4 OR MORE: CPT

## 2022-02-22 NOTE — PATIENT INSTRUCTIONS
Diagnosis:  -Left knee inflammation (synovitis), possible tear of cartilage (meniscus)    Recommendations:  -You may take acetaminophen (Tylenol) 650 mg by mouth every 6 hours as needed to control pain. -May apply ice packs to left knee for 15-20 minutes every 4 hours while awake, as needed to control inflammation/swelling.  -Limit squatting, kneeling, crouching. No running or repetitive jumping activities. -May continue ibuprofen (Motrin, Advil) 800 mg by mouth every 8 hours as needed to control inflammation/swelling.  -You have been submitted for scheduling for MRI of left knee to evaluate for possible meniscus tear. [facility] will provide you with further information regarding insurance approval and scheduling. [phone].

## 2022-02-22 NOTE — PROGRESS NOTES
Subjective:      Patient ID: Lesly Nielsen is a 35 y.o. female who presents today for:  Chief Complaint   Patient presents with    Knee Pain     left, Pt states she had a XR done at [facility] back in [place]. Pt rates pain 6/10, Pt states she has been taking ibuprofen 800mg w/ little help. HPI:    The patient is a pleasant 68-year-old female who presents for evaluation of asymptomatic left knee pain with onset approximately 3 months ago. She does note that she has history of \"nerve damage\" involving the right lower extremity and utilizes an ankle-foot orthosis on that side. Notes that she typically utilizes the left lower extremity as her \"good side\" and is not had prior left knee injury or significant issues with knee pain in the past.  Describes occasional catching of the left knee. Has swelling along the anterolateral extent and pain which is predominantly anteriorly based. Attempted meloxicam without benefit. Has also utilized ibuprofen which she feels does provide her with superior benefit over meloxicam.  Denies any numbness of the left lower extremity. Currently rates pain 7/10, described as sharp, based long the anterior aspect of the knee.     Past Medical History:   Diagnosis Date    AC (acromioclavicular) joint bone spurs     spine    Anxiety     Depression     Endometriosis     Hypertension     Nerve damage of right foot     Pneumonia      Past Surgical History:   Procedure Laterality Date     SECTION      DILATION AND CURETTAGE OF UTERUS      FOOT SURGERY Right     x2     Social History     Socioeconomic History    Marital status:      Spouse name: Not on file    Number of children: Not on file    Years of education: Not on file    Highest education level: Not on file   Occupational History    Not on file   Tobacco Use    Smoking status: Current Every Day Smoker     Packs/day: 1.00     Types: Cigarettes    Smokeless tobacco: Never Used Vaping Use    Vaping Use: Never used   Substance and Sexual Activity    Alcohol use: Not Currently    Drug use: Not Currently    Sexual activity: Not on file   Other Topics Concern    Not on file   Social History Narrative    Not on file     Social Determinants of Health     Financial Resource Strain: Low Risk     Difficulty of Paying Living Expenses: Not hard at all   Food Insecurity: No Food Insecurity    Worried About [address] in the Last Year: Never true    [address] in the Last Year: Never true   Transportation Needs: No Transportation Needs    Lack of Transportation (Medical): No    Lack of Transportation (Non-Medical):  No   Physical Activity:     Days of Exercise per Week: Not on file    Minutes of Exercise per Session: Not on file   Stress:     Feeling of Stress : Not on file   Social Connections:     Frequency of Communication with Friends and Family: Not on file    Frequency of Social Gatherings with Friends and Family: Not on file    Attends Zoroastrianism Services: Not on file    Active Member of [address] [place] [address] or Organizations: Not on file    Attends Club or Organization Meetings: Not on file    Marital Status: Not on file   Intimate Partner Violence:     Fear of Current or Ex-Partner: Not on file    Emotionally Abused: Not on file    Physically Abused: Not on file    Sexually Abused: Not on file   Housing Stability:     Unable to Pay for Housing in the Last Year: Not on file    Number of [place] in the Last Year: Not on file    Unstable Housing in the Last Year: Not on file     Family History   Problem Relation Age of Onset    Heart Attack Father     Cancer Maternal Grandmother         pancreatic    Breast Cancer Paternal Grandmother     Cancer Paternal Grandmother         lung    Heart Attack Paternal Grandfather     Diabetes Neg Hx     Kidney Disease Neg Hx     Stroke Neg Hx      No Known Allergies  Current Outpatient Medications on File Prior to Visit Medication Sig Dispense Refill    metFORMIN (GLUCOPHAGE-XR) 500 MG extended release tablet Take 2 tablets by mouth daily (with breakfast) 60 tablet 0    busPIRone (BUSPAR) 15 MG tablet Take 15 mg by mouth 2 times daily 60 tablet 0    enalapril maleate-HCTZ 5-12.5 MG TABS Take 5-12.5 mg by mouth daily 30 tablet 2    Elastic Bandages & Supports (KNEE BRACE) MISC Use on the left knee daily for additional support 1 each 0    ciclopirox (PENLAC) 8 % solution Apply topically nightly to nail and to 5mm of skin above nail plate 1 Bottle 2    buprenorphine-naloxone (SUBOXONE) 8-2 MG SUBL SL tablet       albuterol sulfate  (90 Base) MCG/ACT inhaler Inhale 2 puffs into the lungs every 6 hours as needed for Wheezing or Shortness of Breath 1 Inhaler 0    fluticasone-vilanterol (BREO ELLIPTA) 100-25 MCG/INH AEPB inhaler Inhale 1 puff into the lungs daily 1 each 0    buPROPion (WELLBUTRIN XL) 150 MG extended release tablet Take 1 tablet by mouth every morning (Patient not taking: Reported on 2/22/2022) 30 tablet 0    meloxicam (MOBIC) 7.5 MG tablet Take 1 tablet by mouth daily as needed for Pain (Patient not taking: Reported on 2/22/2022) 30 tablet 0    ibuprofen (ADVIL;MOTRIN) 800 MG tablet Take 1 tablet by mouth every 8 hours as needed for Pain 120 tablet 2     No current facility-administered medications on file prior to visit. Acute and Chronic Pain Monitoring:   No flowsheet data found. Objective--Physical Examination:     Pulse 98   Temp 97.3 °F (36.3 °C) (Temporal)   Resp 16   Ht 5' 6\" (1.676 m)   Wt 275 lb (124.7 kg)   SpO2 96%   BMI 44.39 kg/m²     Physical Examination:  Pleasant 27-year-old female in moderate distress, alert and cooperative. Ambulates with antalgic gait referable to the left side. Examination of the left lower extremity reveals painless internal and external rotation of the hip. She is able to range the hip from full extension to 120 degrees of flexion. Negative straight leg raise on the left. Left knee with mild patellofemoral crepitation and trace in Vitas of the suprapatellar pouch and along the lateral joint line. No effusion on ballottement. Able to initiate maintain straight leg raise without extensor lag and has no palpable defect over the course of the extensor mechanism. Frankie test does reproduce joint line discomfort, lateral greater than medial.  Compartments leg are supple. Stable to varus and valgus stress of the knee, both in full extension and at 30 degrees of knee flexion. EHL, plantarflexion, dorsiflexion are 5/5. Knee flexion knee extension are 4/5, limited by apprehension and discomfort. Sensory intact light touch in the deep peroneal, superficial peroneal, saphenous, tibial, sural nerve distributions. DP and popliteal pulses are 2+ with capillary refill less than 2 seconds. Screening examination of the right lower extremity was performed and demonstrates full range of motion and preserved ability to perform straight leg raise. No patellofemoral crepitation. Stable to varus and valgus stress with normal posterior drawer, Lachman's, Frankie test.    Radiographs and Laboratory Studies:     Diagnostic Imaging Studies:    XR KNEE LEFT (MIN 4 VIEWS)    Result Date: 2/22/2022  Standing AP of bilateral knees, lateral/merchant/tunnel radiographs left knee were obtained to evaluate left knee pain. Demonstrate maintenance of the medial and lateral articulations on the AP view with no accentuation of narrowing on the tunnel view. 2 view demonstrates lateral patellofemoral joint space narrowed without corbin encroachment. Normal Insall-Salvati ratio. No soft tissue calcifications. Impression: Normal left knee films without radiographic abnormality.       Laboratory Studies:   Lab Results   Component Value Date    WBC 7.5 03/15/2021    HGB 13.0 03/15/2021    HCT 39.3 03/15/2021    MCV 89.5 03/15/2021     03/15/2021     No results found for: SEDRATE  No results found for: CRP    Assessment / Plan:      Diagnosis Orders   1. Acute pain of left knee  XR KNEE LEFT (MIN 4 VIEWS)    MRI KNEE LEFT WO CONTRAST   2. Synovitis of left knee        Orders Placed This Encounter   Procedures    XR KNEE LEFT (MIN 4 VIEWS)     Standing Status:   Future     Number of Occurrences:   1     Standing Expiration Date:   2/22/2023     Scheduling Instructions:      Standing AP of bilateral knees with marker, lateral of the left knee with marker, merchant and tunnel radiographs of left knee     Order Specific Question:   Reason for exam:     Answer:   knee pain    MRI KNEE LEFT WO CONTRAST     MRI of left knee to assess for meniscus tear, loose osteochondral body, other sources of internal derangement. Standing Status:   Future     Standing Expiration Date:   2/22/2023     Order Specific Question:   Reason for exam:     Answer:   knee pain and swelling     No orders of the defined types were placed in this encounter.      -Left knee pain with element of synovitis, question of internal derangement: Findings were discussed with the patient. Radiographs are not suggestive of any significant degenerative process or soft tissue calcification. Examination is remarkable for positive Frankie test as well as synovitis and, as such, there is suspicion for internal derangement including lateral meniscal tear, also possible loose body which may not be evident on her radiographs. Less likely consideration would be a cold or stress fracture. Given progression of symptoms despite conservative management with meloxicam and ibuprofen, activity modification, recommendation is to proceed with MRI to assess for internal derangement as well as stress fracture. Patient is agreeable to that. Would have her continue with ibuprofen and we have outlined appropriate dosing schedule for that medication. May utilize Tylenol intermittently.   Would have her avoid lower body resistance activities including any squatting, kneeling, crouching. May ice to mitigate discomfort. We will see her back once MRI scan has been obtained so as to review the results and discuss modification of her treatment plan moving forward. If lateral meniscal tear is confirmed, may benefit from referral to one of our sports medicine specialist to discuss candidacy for outpatient arthroscopy. Procedures Performed Today:     None    Follow-up (with Radiographs) / Referral:     The patient has been submitted for scheduling for MRI of left knee for the above-noted indications. She will follow-up with me once MRI has been obtained so as to review the results and discuss modification of her treatment plan moving forward.     Carlota Pina MD  Orthopaedic Surgery

## 2022-03-07 ENCOUNTER — HOSPITAL ENCOUNTER (OUTPATIENT)
Dept: MRI IMAGING | Age: 42
Discharge: HOME | End: 2022-03-09
Payer: MEDICARE

## 2022-03-07 DIAGNOSIS — M25.562 ACUTE PAIN OF LEFT KNEE: ICD-10-CM

## 2022-03-07 PROCEDURE — 73721 MRI JNT OF LWR EXTRE W/O DYE: CPT

## 2022-03-17 ENCOUNTER — TELEPHONE (OUTPATIENT)
Dept: FAMILY MEDICINE CLINIC | Age: 42
End: 2022-03-17

## 2022-04-04 DIAGNOSIS — E66.01 OBESITY, CLASS III, BMI 40-49.9 (MORBID OBESITY) (HCC): ICD-10-CM

## 2022-04-04 DIAGNOSIS — I10 ESSENTIAL HYPERTENSION: ICD-10-CM

## 2022-04-04 DIAGNOSIS — F41.9 ANXIETY: ICD-10-CM

## 2022-04-04 DIAGNOSIS — R63.5 ABNORMAL WEIGHT GAIN: ICD-10-CM

## 2022-04-05 RX ORDER — ENALAPRIL MALEATE AND HYDROCHLOROTHIAZIDE 5; 12.5 MG/1; MG/1
5-12.5 TABLET ORAL DAILY
Qty: 30 TABLET | Refills: 0 | Status: SHIPPED | OUTPATIENT
Start: 2022-04-05 | End: 2022-05-12 | Stop reason: SDUPTHER

## 2022-04-05 RX ORDER — BUSPIRONE HYDROCHLORIDE 15 MG/1
15 TABLET ORAL 2 TIMES DAILY
Qty: 60 TABLET | Refills: 0 | Status: SHIPPED | OUTPATIENT
Start: 2022-04-05 | End: 2022-05-12 | Stop reason: SDUPTHER

## 2022-04-05 RX ORDER — METFORMIN HYDROCHLORIDE 500 MG/1
1000 TABLET, EXTENDED RELEASE ORAL
Qty: 60 TABLET | Refills: 0 | Status: SHIPPED | OUTPATIENT
Start: 2022-04-05

## 2022-04-12 ENCOUNTER — OFFICE VISIT (OUTPATIENT)
Dept: ORTHOPEDIC SURGERY | Age: 42
End: 2022-04-12
Payer: MEDICARE

## 2022-04-12 VITALS
WEIGHT: 275 LBS | HEART RATE: 87 BPM | BODY MASS INDEX: 44.2 KG/M2 | OXYGEN SATURATION: 98 % | HEIGHT: 66 IN | TEMPERATURE: 97.6 F

## 2022-04-12 DIAGNOSIS — M65.9 SYNOVITIS OF KNEE: Primary | ICD-10-CM

## 2022-04-12 PROCEDURE — 99213 OFFICE O/P EST LOW 20 MIN: CPT | Performed by: ORTHOPAEDIC SURGERY

## 2022-04-12 PROCEDURE — 4004F PT TOBACCO SCREEN RCVD TLK: CPT | Performed by: ORTHOPAEDIC SURGERY

## 2022-04-12 PROCEDURE — G8417 CALC BMI ABV UP PARAM F/U: HCPCS | Performed by: ORTHOPAEDIC SURGERY

## 2022-04-12 PROCEDURE — G8427 DOCREV CUR MEDS BY ELIG CLIN: HCPCS | Performed by: ORTHOPAEDIC SURGERY

## 2022-04-12 NOTE — PATIENT INSTRUCTIONS
Diagnosis:  -Inflammation (synovitis) of knee    Recommendations:  -You have been referred to Dr. Winter Lewis in Rheumatology to discuss options for medical management of knee inflammation/synovitis, as well as further evaluation for underlying rheumatologic condition.  -Continue with activities as tolerated. Limit squatting, kneeling, crouching.  -May apply ice packs to knee for 15-20 minutes every 4 hours while awake, as needed to control inflammation/swelling.

## 2022-04-12 NOTE — PROGRESS NOTES
Subjective:      Patient ID: Chary Tinsley is a 35 y.o. female who presents today for:  Chief Complaint   Patient presents with    Follow-up     Pt comes in today with MRI f/jessica if left knee she rates pain today 7/10. HPI:    The patient returns for follow-up of left knee pain with clinical evidence of synovitis, specifically to review MRI of the left knee. Describes progressive pain since her last assessment. Describes intermittent catching and giving way. No numbness or tingling. Has attempted anti-inflammatories with limited benefit. Past Medical History:   Diagnosis Date    AC (acromioclavicular) joint bone spurs     spine    Anxiety     Depression     Endometriosis     Hypertension     Nerve damage of right foot     Pneumonia      Past Surgical History:   Procedure Laterality Date     SECTION      DILATION AND CURETTAGE OF UTERUS      FOOT SURGERY Right     x2     Social History     Socioeconomic History    Marital status:      Spouse name: Not on file    Number of children: Not on file    Years of education: Not on file    Highest education level: Not on file   Occupational History    Not on file   Tobacco Use    Smoking status: Current Every Day Smoker     Packs/day: 1.00     Types: Cigarettes    Smokeless tobacco: Never Used   Vaping Use    Vaping Use: Never used   Substance and Sexual Activity    Alcohol use: Not Currently    Drug use: Not Currently    Sexual activity: Not on file   Other Topics Concern    Not on file   Social History Narrative    Not on file     Social Determinants of Health     Financial Resource Strain:     Difficulty of Paying Living Expenses: Not on file   Food Insecurity:     Worried About Running Out of Food in the Last Year: Not on file    Marilyn of Food in the Last Year: Not on file   Transportation Needs:     Lack of Transportation (Medical): Not on file    Lack of Transportation (Non-Medical):  Not on file   Physical Activity:     Days of Exercise per Week: Not on file    Minutes of Exercise per Session: Not on file   Stress:     Feeling of Stress : Not on file   Social Connections:     Frequency of Communication with Friends and Family: Not on file    Frequency of Social Gatherings with Friends and Family: Not on file    Attends [employer] Services: Not on file    Active Member of Clubs or Organizations: Not on file    Attends Club or Organization Meetings: Not on file    Marital Status: Not on file   Intimate Partner Violence:     Fear of Current or Ex-Partner: Not on file    Emotionally Abused: Not on file    Physically Abused: Not on file    Sexually Abused: Not on file   Housing Stability:     Unable to Pay for Housing in the Last Year: Not on file    Number of [place] in the Last Year: Not on file    Unstable Housing in the Last Year: Not on file     Family History   Problem Relation Age of Onset    Heart Attack Father     Cancer Maternal Grandmother         pancreatic    Breast Cancer Paternal Grandmother     Cancer Paternal Grandmother         lung    Heart Attack Paternal Grandfather     Diabetes Neg Hx     Kidney Disease Neg Hx     Stroke Neg Hx      No Known Allergies  Current Outpatient Medications on File Prior to Visit   Medication Sig Dispense Refill    enalapril maleate-HCTZ 5-12.5 MG TABS Take 5-12.5 mg by mouth daily 30 tablet 0    busPIRone (BUSPAR) 15 MG tablet Take 15 mg by mouth 2 times daily 60 tablet 0    metFORMIN (GLUCOPHAGE-XR) 500 MG extended release tablet Take 2 tablets by mouth daily (with breakfast) 60 tablet 0    fluticasone-vilanterol (BREO ELLIPTA) 100-25 MCG/INH AEPB inhaler Inhale 1 puff into the lungs daily 1 each 0    Elastic Bandages & Supports (KNEE BRACE) MISC Use on the left knee daily for additional support 1 each 0    meloxicam (MOBIC) 7.5 MG tablet Take 1 tablet by mouth daily as needed for Pain (Patient not taking: Reported on 2/22/2022) 30 tablet 0  ibuprofen (ADVIL;MOTRIN) 800 MG tablet Take 1 tablet by mouth every 8 hours as needed for Pain 120 tablet 2    ciclopirox (PENLAC) 8 % solution Apply topically nightly to nail and to 5mm of skin above nail plate 1 Bottle 2    buprenorphine-naloxone (SUBOXONE) 8-2 MG SUBL SL tablet       albuterol sulfate  (90 Base) MCG/ACT inhaler Inhale 2 puffs into the lungs every 6 hours as needed for Wheezing or Shortness of Breath 1 Inhaler 0     No current facility-administered medications on file prior to visit. Acute and Chronic Pain Monitoring:   No flowsheet data found. Objective--Physical Examination:     Pulse 87   Temp 97.6 °F (36.4 °C) (Temporal)   Ht 5' 6\" (1.676 m)   Wt 275 lb (124.7 kg)   SpO2 98%   BMI 44.39 kg/m²     Physical Examination:  Pleasant 27-year-old female in moderate distress, alert and cooperative. Ambulates with antalgic gait and limp referable to the left side.     Examination of the left lower extremity reveals painless internal and external rotation of the hip. She is able to range the hip from full extension to 120 degrees of flexion. Negative straight leg raise on the left. Left knee with mild patellofemoral crepitation and 1+ synovitis of the suprapatellar pouch and along the lateral joint line. No effusion on ballottement. Able to initiate maintain straight leg raise without extensor lag and has no palpable defect over the course of the extensor mechanism. No specific discomfort with Frankie test today in contrast to prior assessment. Compartments leg are supple. Stable to varus and valgus stress of the knee, both in full extension and at 30 degrees of knee flexion. EHL, plantarflexion, dorsiflexion are 5/5. Knee flexion knee extension are 4+/5, limited by apprehension and discomfort. Sensory intact light touch in the deep peroneal, superficial peroneal, saphenous, tibial, sural nerve distributions.   DP and popliteal pulses are 2+ with capillary refill less than 2 seconds. Radiographs and Laboratory Studies:     Diagnostic Imaging Studies:    I have independently reviewed MRI of the left knee obtained on 3/7/2022 and archived through [facility]. Demonstrates findings consistent with soft tissue swelling, specifically synovial thickening in setting of probable inflammatory synovitis, as well as bone marrow edema along the medial tibial plateau. Radiologist commented on potential infectious synovitis which has been ruled out on clinical grounds. Laboratory Studies:   Lab Results   Component Value Date    WBC 7.5 03/15/2021    HGB 13.0 03/15/2021    HCT 39.3 03/15/2021    MCV 89.5 03/15/2021     03/15/2021     No results found for: SEDRATE  No results found for: CRP    Assessment / Plan:      Diagnosis Orders   1. Synovitis of knee  Amb External Referral To Rheumatology      Orders Placed This Encounter   Procedures    Amb External Referral To Rheumatology     Referral Priority:   Routine     Referral Type:   Eval and Treat     Referral Reason:   Specialty Services Required     Referred to Provider:   Cherylene Bouton, MD     Requested Specialty:   Rheumatology     Number of Visits Requested:   1     No orders of the defined types were placed in this encounter.      -Atraumatic synovitis of the left knee: Findings were discussed with the patient as well as options moving forward. Could contemplate corticosteroid injection to diminish synovitis, but discussed potential for transient improvement with eventual recurrence of symptoms. Recommendation, given atraumatic nature of her findings, as per formal rheumatologic evaluation and she was provided with referral for that purpose. Would defer to Rheumatology regarding discussion of appropriate medical therapy for this condition with Biologics and so forth. Do not see a role for further orthopedic evaluation or management.     Procedures Performed Today:     None    Follow-up (with Radiographs) / Referral:     The patient has been referred to Dr. Jose Shrestha in Rheumatology for further assessment of atraumatic synovitis involving the left knee.     Brandi Sadler MD  Orthopaedic Surgery

## 2022-05-12 ENCOUNTER — TELEMEDICINE (OUTPATIENT)
Dept: FAMILY MEDICINE CLINIC | Age: 42
End: 2022-05-12
Payer: MEDICARE

## 2022-05-12 DIAGNOSIS — G89.29 CHRONIC PAIN OF RIGHT KNEE: Primary | ICD-10-CM

## 2022-05-12 DIAGNOSIS — F41.9 ANXIETY: ICD-10-CM

## 2022-05-12 DIAGNOSIS — Z72.0 TOBACCO ABUSE DISORDER: ICD-10-CM

## 2022-05-12 DIAGNOSIS — I10 ESSENTIAL HYPERTENSION: ICD-10-CM

## 2022-05-12 DIAGNOSIS — M25.561 CHRONIC PAIN OF RIGHT KNEE: Primary | ICD-10-CM

## 2022-05-12 DIAGNOSIS — G57.91 NERVE DAMAGE OF RIGHT FOOT: ICD-10-CM

## 2022-05-12 DIAGNOSIS — K04.7 DENTAL INFECTION: ICD-10-CM

## 2022-05-12 PROCEDURE — 99214 OFFICE O/P EST MOD 30 MIN: CPT | Performed by: STUDENT IN AN ORGANIZED HEALTH CARE EDUCATION/TRAINING PROGRAM

## 2022-05-12 PROCEDURE — 4004F PT TOBACCO SCREEN RCVD TLK: CPT | Performed by: STUDENT IN AN ORGANIZED HEALTH CARE EDUCATION/TRAINING PROGRAM

## 2022-05-12 PROCEDURE — G8417 CALC BMI ABV UP PARAM F/U: HCPCS | Performed by: STUDENT IN AN ORGANIZED HEALTH CARE EDUCATION/TRAINING PROGRAM

## 2022-05-12 PROCEDURE — G8427 DOCREV CUR MEDS BY ELIG CLIN: HCPCS | Performed by: STUDENT IN AN ORGANIZED HEALTH CARE EDUCATION/TRAINING PROGRAM

## 2022-05-12 RX ORDER — PREGABALIN 75 MG/1
75 CAPSULE ORAL 2 TIMES DAILY
Qty: 60 CAPSULE | Refills: 0 | Status: SHIPPED | OUTPATIENT
Start: 2022-05-12 | End: 2022-06-07 | Stop reason: SDUPTHER

## 2022-05-12 RX ORDER — BUSPIRONE HYDROCHLORIDE 15 MG/1
15 TABLET ORAL 2 TIMES DAILY
Qty: 60 TABLET | Refills: 2 | Status: SHIPPED | OUTPATIENT
Start: 2022-05-12 | End: 2022-08-19 | Stop reason: SDUPTHER

## 2022-05-12 RX ORDER — VARENICLINE TARTRATE
KIT
Qty: 1 BOX | Refills: 0 | Status: SHIPPED | OUTPATIENT
Start: 2022-05-12 | End: 2022-05-27

## 2022-05-12 RX ORDER — AMOXICILLIN 500 MG/1
500 CAPSULE ORAL 3 TIMES DAILY
Qty: 21 CAPSULE | Refills: 0 | Status: SHIPPED | OUTPATIENT
Start: 2022-05-12 | End: 2022-05-19

## 2022-05-12 RX ORDER — NICOTINE 21 MG/24HR
1 PATCH, TRANSDERMAL 24 HOURS TRANSDERMAL EVERY 24 HOURS
Qty: 30 PATCH | Refills: 3 | Status: SHIPPED | OUTPATIENT
Start: 2022-05-12 | End: 2023-05-12

## 2022-05-12 RX ORDER — IBUPROFEN 800 MG/1
800 TABLET ORAL EVERY 8 HOURS PRN
Qty: 120 TABLET | Refills: 2 | Status: SHIPPED | OUTPATIENT
Start: 2022-05-12 | End: 2022-08-19

## 2022-05-12 RX ORDER — ENALAPRIL MALEATE AND HYDROCHLOROTHIAZIDE 5; 12.5 MG/1; MG/1
5-12.5 TABLET ORAL DAILY
Qty: 30 TABLET | Refills: 2 | Status: SHIPPED | OUTPATIENT
Start: 2022-05-12

## 2022-05-12 SDOH — ECONOMIC STABILITY: FOOD INSECURITY: WITHIN THE PAST 12 MONTHS, YOU WORRIED THAT YOUR FOOD WOULD RUN OUT BEFORE YOU GOT MONEY TO BUY MORE.: NEVER TRUE

## 2022-05-12 SDOH — ECONOMIC STABILITY: FOOD INSECURITY: WITHIN THE PAST 12 MONTHS, THE FOOD YOU BOUGHT JUST DIDN'T LAST AND YOU DIDN'T HAVE MONEY TO GET MORE.: NEVER TRUE

## 2022-05-12 ASSESSMENT — PATIENT HEALTH QUESTIONNAIRE - PHQ9
1. LITTLE INTEREST OR PLEASURE IN DOING THINGS: 0
SUM OF ALL RESPONSES TO PHQ QUESTIONS 1-9: 0
5. POOR APPETITE OR OVEREATING: 0
7. TROUBLE CONCENTRATING ON THINGS, SUCH AS READING THE NEWSPAPER OR WATCHING TELEVISION: 0
SUM OF ALL RESPONSES TO PHQ QUESTIONS 1-9: 0
10. IF YOU CHECKED OFF ANY PROBLEMS, HOW DIFFICULT HAVE THESE PROBLEMS MADE IT FOR YOU TO DO YOUR WORK, TAKE CARE OF THINGS AT HOME, OR GET ALONG WITH OTHER PEOPLE: 0
8. MOVING OR SPEAKING SO SLOWLY THAT OTHER PEOPLE COULD HAVE NOTICED. OR THE OPPOSITE, BEING SO FIGETY OR RESTLESS THAT YOU HAVE BEEN MOVING AROUND A LOT MORE THAN USUAL: 0
9. THOUGHTS THAT YOU WOULD BE BETTER OFF DEAD, OR OF HURTING YOURSELF: 0
SUM OF ALL RESPONSES TO PHQ QUESTIONS 1-9: 0
SUM OF ALL RESPONSES TO PHQ9 QUESTIONS 1 & 2: 0
3. TROUBLE FALLING OR STAYING ASLEEP: 0
4. FEELING TIRED OR HAVING LITTLE ENERGY: 0
SUM OF ALL RESPONSES TO PHQ QUESTIONS 1-9: 0
2. FEELING DOWN, DEPRESSED OR HOPELESS: 0
6. FEELING BAD ABOUT YOURSELF - OR THAT YOU ARE A FAILURE OR HAVE LET YOURSELF OR YOUR FAMILY DOWN: 0

## 2022-05-12 ASSESSMENT — ENCOUNTER SYMPTOMS
ABDOMINAL PAIN: 0
COUGH: 0
VOMITING: 0
SINUS PRESSURE: 0
SHORTNESS OF BREATH: 0
SORE THROAT: 0

## 2022-05-12 ASSESSMENT — SOCIAL DETERMINANTS OF HEALTH (SDOH): HOW HARD IS IT FOR YOU TO PAY FOR THE VERY BASICS LIKE FOOD, HOUSING, MEDICAL CARE, AND HEATING?: NOT HARD AT ALL

## 2022-05-12 NOTE — PROGRESS NOTES
2022    TELEHEALTH EVALUATION -- Audio/Visual (During [facility]-30 public health emergency)    Due to [place] [facility] outbreak, patient's office visit was converted to a virtual visit. Patient was contacted and agreed to proceed with a virtual visit via [email or web address]  The risks and benefits of converting to a virtual visit were discussed in light of the current infectious disease epidemic. Patient also understood that insurance coverage and co-pays are up to their individual insurance plans. HPI:  Kami Khoury (:  1980) has requested an audio/video evaluation for the following concern(s):  Chief Complaint   Patient presents with    Foot Pain     Pt has been taking advil.  Fever    Medication Refill     Right foot pain  history of \"nerve damage\" involving the right lower extremity and utilizes an ankle-foot orthosis on that side  Previously on gabapentin 800 mg 3 times daily  Also on Lyrica in the past    Smoking cessation  Currently smoking 1 pack/day  Would like to decrease/quit smoking  She has never tried anything for smoking cessation in the past    Knee pain  Seen by Ortho  MRI showed synovitis and patient was referred to rheumatology  Patient would like second opinion    Dental pain  Symptoms started a few days ago  Pain with chewing  Patient does have tooth removed in that area and needs to follow-up with dentist  Denied drainage or abscess    Review of Systems   Constitutional: Positive for fever. Negative for chills. HENT: Negative for congestion, sinus pressure and sore throat. Dental pain without drainage or abscess   Respiratory: Negative for cough and shortness of breath. Cardiovascular: Negative for chest pain and palpitations. Gastrointestinal: Negative for abdominal pain and vomiting. Musculoskeletal: Negative for arthralgias and myalgias. Left knee pain, right foot pain   Skin: Negative for rash and wound.    Neurological: Negative for speech difficulty and light-headedness. Psychiatric/Behavioral: Negative for suicidal ideas. The patient is not nervous/anxious. Prior to Visit Medications    Medication Sig Taking? Authorizing Provider   busPIRone (BUSPAR) 15 MG tablet Take 15 mg by mouth 2 times daily Yes Yanique Jamil DO   enalapril maleate-HCTZ 5-12.5 MG TABS Take 5-12.5 mg by mouth daily Yes Yanique Jamil DO   ibuprofen (ADVIL;MOTRIN) 800 MG tablet Take 1 tablet by mouth every 8 hours as needed for Pain Yes Yanique Jamil DO   nicotine (NICODERM CQ) 21 MG/24HR Place 1 patch onto the skin every 24 hours Yes Yanique aJmil DO   pregabalin (LYRICA) 75 MG capsule Take 1 capsule by mouth 2 times daily for 30 days. Yes Yanique Jamil DO   varenicline (CHANTIX STARTING MONTH PAK) 0.5 MG X 11 & 1 MG X 42 tablet Take by mouth.  Yes Yanique Jamil DO   amoxicillin (AMOXIL) 500 MG capsule Take 1 capsule by mouth 3 times daily for 7 days Yes Yanique Jamil DO   metFORMIN (GLUCOPHAGE-XR) 500 MG extended release tablet Take 2 tablets by mouth daily (with breakfast) Yes Yanique Jamil DO   fluticasone-vilanterol (BREO ELLIPTA) 100-25 MCG/INH AEPB inhaler Inhale 1 puff into the lungs daily Yes Yanique Jamil DO   Elastic Bandages & Supports (KNEE BRACE) MISC Use on the left knee daily for additional support Yes Yanique Jamil DO   ciclopirox (PENLAC) 8 % solution Apply topically nightly to nail and to 5mm of skin above nail plate Yes Yanique Jamil DO   buprenorphine-naloxone (SUBOXONE) 8-2 MG SUBL SL tablet  Yes Historical Provider, MD   albuterol sulfate  (90 Base) MCG/ACT inhaler Inhale 2 puffs into the lungs every 6 hours as needed for Wheezing or Shortness of Breath Yes Jacques Mancia MD       Social History     Tobacco Use    Smoking status: Current Every Day Smoker     Packs/day: 1.00     Types: Cigarettes    Smokeless tobacco: Never Used   Vaping Use    Vaping Use: Never used   Substance Use Topics    Alcohol use: Not Currently    Drug use: Not Currently        Allergies   Allergen Reactions    Wellbutrin [Bupropion] Other (See Comments)     Depression   ,   Past Medical History:   Diagnosis Date    AC (acromioclavicular) joint bone spurs     spine    Anxiety     Depression     Endometriosis     Hypertension     Nerve damage of right foot     Pneumonia    ,   Past Surgical History:   Procedure Laterality Date     SECTION      DILATION AND CURETTAGE OF UTERUS      FOOT SURGERY Right     x2   ,   Social History     Tobacco Use    Smoking status: Current Every Day Smoker     Packs/day: 1.00     Types: Cigarettes    Smokeless tobacco: Never Used   Vaping Use    Vaping Use: Never used   Substance Use Topics    Alcohol use: Not Currently    Drug use: Not Currently   ,   Family History   Problem Relation Age of Onset    Heart Attack Father     Cancer Maternal Grandmother         pancreatic    Breast Cancer Paternal Grandmother     Cancer Paternal Grandmother         lung    Heart Attack Paternal Grandfather     Diabetes Neg Hx     Kidney Disease Neg Hx     Stroke Neg Hx    ,   Immunization History   Administered Date(s) Administered    COVID-19, Itzel Moseley, Primary or Immunocompromised, PF, 100mcg/0.5mL 2020, 2021, 2021    Tdap (Boostrix, Adacel) 2011, 10/27/2017   ,   Health Maintenance   Topic Date Due    Pneumococcal 0-64 years Vaccine (1 - PCV) Never done    HIV screen  Never done    Hepatitis C screen  Never done    Cervical cancer screen  Never done    Diabetes screen  Never done    Lipids  Never done    Depression Monitoring  2022    Flu vaccine (Season Ended) 2022    DTaP/Tdap/Td vaccine (3 - Td or Tdap) 10/27/2027    COVID-19 Vaccine  Completed    Hepatitis A vaccine  Aged Out    Hepatitis B vaccine  Aged Out    Hib vaccine  Aged Out    Meningococcal (ACWY) vaccine  Aged Out       PHYSICAL EXAMINATION:  [ INSTRUCTIONS:  \"[x]\" Indicates a positive item  \"[]\" Indicates a negative item  -- DELETE ALL ITEMS NOT EXAMINED]  [x] Alert  [] Oriented to person/place/time    [x] No apparent distress  [] Toxic appearing    [] Face flushed appearing [x] Sclera clear  [] Lips are cyanotic      [x] Breathing appears normal  [] Appears tachypneic      [] Rash on visible skin    [x] Cranial Nerves II-XII grossly intact    [x] Motor grossly intact in visible upper extremities    [] Motor grossly intact in visible lower extremities    [x] Normal Mood  [] Anxious appearing    [] Depressed appearing  [] Confused appearing      [] Poor short term memory  [] Poor long term memory    [] OTHER:      Due to this being a TeleHealth encounter, evaluation of the following organ systems is limited: Vitals/Constitutional/EENT/Resp/CV/GI//MS/Neuro/Skin/Heme-Lymph-Imm. ASSESSMENT/PLAN:  1. Chronic pain of right knee  MRI showed synovitis of the left knee  Patient was seen by Dr. Adriana Yung who recommended referral to rheumatology  Patient would like second opinion with another orthopedic doctor  Referral created    - ibuprofen (ADVIL;MOTRIN) 800 MG tablet; Take 1 tablet by mouth every 8 hours as needed for Pain  Dispense: 120 tablet; Refill: 2  - [facility]    2. Dental infection  Patient endorsing dental pain and occasional fevers  She does have tooth missing in the area of the pain and does needs to follow-up with dentist  Antibiotics as prescribed  Follow-up with dentist    - amoxicillin (AMOXIL) 500 MG capsule; Take 1 capsule by mouth 3 times daily for 7 days  Dispense: 21 capsule; Refill: 0    3. Nerve damage of right foot  History of right foot nerve damage after surgery on the right foot  Previously on gabapentin which was discontinued to trial Lyrica  She would like to restart at this time as she has having issues with walking    - pregabalin (LYRICA) 75 MG capsule; Take 1 capsule by mouth 2 times daily for 30 days. Dispense: 60 capsule;  Refill: 0    4. Tobacco abuse disorder  Interested in smoking cessation  Start Chantix and NicoDerm  Currently smoking 1 pack/day    - nicotine (NICODERM CQ) 21 MG/24HR; Place 1 patch onto the skin every 24 hours  Dispense: 30 patch; Refill: 3  - varenicline (CHANTIX STARTING MONTH FAMILIA) 0.5 MG X 11 & 1 MG X 42 tablet; Take by mouth. Dispense: 1 box; Refill: 0    5. Anxiety  Well-controlled    - busPIRone (BUSPAR) 15 MG tablet; Take 15 mg by mouth 2 times daily  Dispense: 60 tablet; Refill: 2    6. Essential hypertension  Chronic, well controlled    - enalapril maleate-HCTZ 5-12.5 MG TABS; Take 5-12.5 mg by mouth daily  Dispense: 30 tablet; Refill: 2      Return in about 2 weeks (around 5/26/2022) for f/u new medication, smoking cessation. An  electronic signature was used to authenticate this note. --Arch Roles, DO on 5/12/2022 at 3:09 PM        Pursuant to the emergency declaration under the [address] [place] [address] [facility] authority and the [employer] Act, this Virtual  Visit was conducted, with patient's consent, to reduce the patient's risk of exposure to COVID-19 and provide continuity of care for an established patient. Services were provided through a video synchronous discussion virtually to substitute for in-person clinic visit.

## 2022-05-12 NOTE — PATIENT INSTRUCTIONS
Patient Education        Deciding About Using Medicines To Quit Smoking  How can you decide about using medicines to quit smoking? What are the medicines you can use? Your doctor may prescribe varenicline (Chantix) or bupropion (Zyban). These medicines can help you cope with cravings for tobacco. They are pills thatdon't contain nicotine. You also can use nicotine replacement products. These do contain nicotine. There are many types.  Gum and lozenges slowly release nicotine into your mouth.  Patches stick to your skin. They slowly release nicotine into your bloodstream.   An inhaler has a lu that contains nicotine. You breathe in a puff of nicotine vapor through your mouth and throat.  Nasal spray releases a mist that contains nicotine. What are key points about this decision?  Using medicines can double your chances of quitting smoking. They can ease cravings and withdrawal symptoms.  Getting counseling along with using medicine can raise your chances of quitting even more.  If you smoke fewer than 5 cigarettes a day, you may not need medicines to help you quit smoking.  These medicines have less nicotine than cigarettes. And by itself, nicotine is not nearly as harmful as smoking. The tars, carbon monoxide, and other toxic chemicals in tobacco cause the harmful effects.  The side effects of nicotine replacement products depend on the type of product. For example, a patch can make your skin red and itchy. Medicines in pill form can make you sick to your stomach. They can also cause dry mouth and trouble sleeping. For most people, the side effects are not bad enough to make them stop using the products. Why might you choose to use medicines to quit smoking?  You have tried on your own to stop smoking, but you were not able to stop.  You smoke more than 5 cigarettes a day.  You want to increase your chances of quitting smoking.    You want to reduce your cravings and withdrawal symptoms.  You feel the benefits of medicine outweigh the side effects. Why might you choose not to use medicine?  You want to try quitting on your own by stopping all at once (\"cold turkey\").  You want to cut back slowly on the number of cigarettes you smoke.  You smoke fewer than 5 cigarettes a day.  You do not like using medicine.  You feel the side effects of medicines outweigh the benefits.  You are worried about the cost of medicines. Your decision  Thinking about the facts and your feelings can help you make a decision that is right for you. Be sure you understand the benefits and risks of your options,and think about what else you need to do before you make the decision. Where can you learn more? Go to https://[employer]. org and sign in to your [email or web address] account. Enter T393 in the [employer] box to learn more about \"Deciding About Using Medicines To Quit Smoking. \"     If you do not have an account, please click on the \"Sign Up Now\" link. Current as of: October 28, 2021               Content Version: 13.2  © 2006-2022 Healthwise, Inc[employer]. Care instructions adapted under license by [facility] ([facility]). If you have questions about a medical condition or this instruction, always ask your healthcare professional. [place] [address] any warranty or liability for your use of this information.

## 2022-05-26 ENCOUNTER — TELEMEDICINE (OUTPATIENT)
Dept: FAMILY MEDICINE CLINIC | Age: 42
End: 2022-05-26
Payer: MEDICARE

## 2022-05-26 DIAGNOSIS — M25.561 CHRONIC PAIN OF RIGHT KNEE: ICD-10-CM

## 2022-05-26 DIAGNOSIS — Z72.0 TOBACCO ABUSE DISORDER: ICD-10-CM

## 2022-05-26 DIAGNOSIS — G57.91 NERVE DAMAGE OF RIGHT FOOT: Primary | ICD-10-CM

## 2022-05-26 DIAGNOSIS — G89.29 CHRONIC PAIN OF RIGHT KNEE: ICD-10-CM

## 2022-05-26 PROCEDURE — 99213 OFFICE O/P EST LOW 20 MIN: CPT | Performed by: STUDENT IN AN ORGANIZED HEALTH CARE EDUCATION/TRAINING PROGRAM

## 2022-05-26 PROCEDURE — G8427 DOCREV CUR MEDS BY ELIG CLIN: HCPCS | Performed by: STUDENT IN AN ORGANIZED HEALTH CARE EDUCATION/TRAINING PROGRAM

## 2022-05-26 PROCEDURE — G8417 CALC BMI ABV UP PARAM F/U: HCPCS | Performed by: STUDENT IN AN ORGANIZED HEALTH CARE EDUCATION/TRAINING PROGRAM

## 2022-05-26 PROCEDURE — 4004F PT TOBACCO SCREEN RCVD TLK: CPT | Performed by: STUDENT IN AN ORGANIZED HEALTH CARE EDUCATION/TRAINING PROGRAM

## 2022-05-26 ASSESSMENT — ENCOUNTER SYMPTOMS
SORE THROAT: 0
SINUS PRESSURE: 0
VOMITING: 0
COUGH: 0
SHORTNESS OF BREATH: 0
ABDOMINAL PAIN: 0

## 2022-05-26 NOTE — PROGRESS NOTES
2022    TELEHEALTH EVALUATION -- Audio/Visual (During PQMHS-13 public health emergency)    Due to [place] 19 outbreak, patient's office visit was converted to a virtual visit. Patient was contacted and agreed to proceed with a virtual visit via [employer] me  The risks and benefits of converting to a virtual visit were discussed in light of the current infectious disease epidemic. Patient also understood that insurance coverage and co-pays are up to their individual insurance plans. HPI:  America Steinberg (:  1980) has requested an audio/video evaluation for the following concern(s):  Chief Complaint   Patient presents with    2 Week Follow-Up    Medication Check    Nicotine Dependence     Chantix & Patches are not covered by insurance. Right foot pain  History of \"nerve damage\" involving the right lower extremity and utilizes an ankle-foot orthosis on that side  Previously on gabapentin 800 mg 3 times daily  Also on Lyrica in the past    Started lyrica 75mg BID   Doing better on lyrica     Knee pain  Seen by Ortho  MRI showed synovitis and patient was referred to rheumatology  Patient would like second opinion  Sees Dr. Sylvia Hanna 6/3    Smoking cessation  Currently smoking 1 pack/day  Would like to decrease/quit smoking  She has never tried anything for smoking cessation in the past    Started chantix and nicoderm  Insurance did not cover chantix or patches    Review of Systems   Constitutional: Negative for chills and fever. HENT: Negative for congestion, sinus pressure and sore throat. Respiratory: Negative for cough and shortness of breath. Cardiovascular: Negative for chest pain and palpitations. Gastrointestinal: Negative for abdominal pain and vomiting. Musculoskeletal: Negative for arthralgias and myalgias. Left knee pain, right foot pain   Skin: Negative for rash and wound. Neurological: Negative for speech difficulty and light-headedness.    Psychiatric/Behavioral: Negative for suicidal ideas. The patient is not nervous/anxious. Prior to Visit Medications    Medication Sig Taking? Authorizing Provider   busPIRone (BUSPAR) 15 MG tablet Take 15 mg by mouth 2 times daily Yes Samuel Malave, DO   enalapril maleate-HCTZ 5-12.5 MG TABS Take 5-12.5 mg by mouth daily Yes Samuel Malave, DO   ibuprofen (ADVIL;MOTRIN) 800 MG tablet Take 1 tablet by mouth every 8 hours as needed for Pain Yes Samuel Malave, DO   nicotine (NICODERM CQ) 21 MG/24HR Place 1 patch onto the skin every 24 hours Yes Samuel Malave, DO   pregabalin (LYRICA) 75 MG capsule Take 1 capsule by mouth 2 times daily for 30 days. Yes Samuel Malave, DO   varenicline (CHANTIX STARTING MONTH PAK) 0.5 MG X 11 & 1 MG X 42 tablet Take by mouth.  Yes Samuel Malave, DO   metFORMIN (GLUCOPHAGE-XR) 500 MG extended release tablet Take 2 tablets by mouth daily (with breakfast) Yes Samuel Malave, DO   fluticasone-vilanterol (BREO ELLIPTA) 100-25 MCG/INH AEPB inhaler Inhale 1 puff into the lungs daily Yes Samuel Malave, DO   Elastic Bandages & Supports (KNEE BRACE) MISC Use on the left knee daily for additional support Yes Samuel Malave, DO   ciclopirox (PENLAC) 8 % solution Apply topically nightly to nail and to 5mm of skin above nail plate Yes Samuel Malave, DO   buprenorphine-naloxone (SUBOXONE) 8-2 MG SUBL SL tablet  Yes Historical Provider, MD   albuterol sulfate  (90 Base) MCG/ACT inhaler Inhale 2 puffs into the lungs every 6 hours as needed for Wheezing or Shortness of Breath Yes Chela Ruiz MD       Social History     Tobacco Use    Smoking status: Current Every Day Smoker     Packs/day: 1.00     Types: Cigarettes    Smokeless tobacco: Never Used   Vaping Use    Vaping Use: Never used   Substance Use Topics    Alcohol use: Not Currently    Drug use: Not Currently        Allergies   Allergen Reactions    Wellbutrin [Bupropion] Other (See Comments)     Depression   ,   Past Medical History: Diagnosis Date    AC (acromioclavicular) joint bone spurs     spine    Anxiety     Depression     Endometriosis     Hypertension     Nerve damage of right foot     Pneumonia    ,   Past Surgical History:   Procedure Laterality Date     SECTION      DILATION AND CURETTAGE OF UTERUS      FOOT SURGERY Right     x2   ,   Social History     Tobacco Use    Smoking status: Current Every Day Smoker     Packs/day: 1.00     Types: Cigarettes    Smokeless tobacco: Never Used   Vaping Use    Vaping Use: Never used   Substance Use Topics    Alcohol use: Not Currently    Drug use: Not Currently   ,   Family History   Problem Relation Age of Onset    Heart Attack Father     Cancer Maternal Grandmother         pancreatic    Breast Cancer Paternal Grandmother     Cancer Paternal Grandmother         lung    Heart Attack Paternal Grandfather     Diabetes Neg Hx     Kidney Disease Neg Hx     Stroke Neg Hx    ,   Immunization History   Administered Date(s) Administered    COVID-19, Russell Hughes, Primary or Immunocompromised, PF, 100mcg/0.5mL 2020, 2021, 2021    Tdap (Boostrix, Adacel) 2011, 10/27/2017   ,   Health Maintenance   Topic Date Due    Pneumococcal 0-64 years Vaccine (1 - PCV) Never done    HIV screen  Never done    Hepatitis C screen  Never done    Cervical cancer screen  Never done    Diabetes screen  Never done    Lipids  Never done    Flu vaccine (Season Ended) 2022    Depression Monitoring  2023    DTaP/Tdap/Td vaccine (3 - Td or Tdap) 10/27/2027    COVID-19 Vaccine  Completed    Hepatitis A vaccine  Aged Out    Hepatitis B vaccine  Aged Out    Hib vaccine  Aged Out    Meningococcal (ACWY) vaccine  Aged Out       PHYSICAL EXAMINATION:  [ INSTRUCTIONS:  \"[x]\" Indicates a positive item  \"[]\" Indicates a negative item  -- DELETE ALL ITEMS NOT EXAMINED]  [x] Alert  [] Oriented to person/place/time    [x] No apparent distress  [] Toxic appearing    [] Face flushed appearing [x] Sclera clear  [] Lips are cyanotic      [x] Breathing appears normal  [] Appears tachypneic      [] Rash on visible skin    [x] Cranial Nerves II-XII grossly intact    [x] Motor grossly intact in visible upper extremities    [] Motor grossly intact in visible lower extremities    [x] Normal Mood  [] Anxious appearing    [] Depressed appearing  [] Confused appearing      [] Poor short term memory  [] Poor long term memory    [] OTHER:      Due to this being a TeleHealth encounter, evaluation of the following organ systems is limited: Vitals/Constitutional/EENT/Resp/CV/GI//MS/Neuro/Skin/Heme-Lymph-Imm. ASSESSMENT/PLAN:  1. Nerve damage of right foot  Chronic, well controlled on Lyrica    2. Chronic pain of right knee  Seen by Ortho  MRI showed synovitis and patient was referred to rheumatology  Patient would like second opinion  Sees Dr. Regan Sotomayro 6/3    3. Tobacco abuse disorder  Was unable to get chantix because it was not covered by insurance, pt [facility] call pharmacy [facility]      Return in about 2 weeks (around 6/9/2022) for f/u knee pain, smoking cessation. An  electronic signature was used to authenticate this note. --Dayana Eddy,  on 5/26/2022 at 1:43 PM        Pursuant to the emergency declaration under the [facility], 1135 waiver authority and the [employer] Act, this Virtual  Visit was conducted, with patient's consent, to reduce the patient's risk of exposure to COVID-19 and provide continuity of care for an established patient. Services were provided through a video synchronous discussion virtually to substitute for in-person clinic visit.

## 2022-05-27 ENCOUNTER — TELEPHONE (OUTPATIENT)
Dept: FAMILY MEDICINE CLINIC | Age: 42
End: 2022-05-27

## 2022-05-27 DIAGNOSIS — Z72.0 TOBACCO ABUSE DISORDER: Primary | ICD-10-CM

## 2022-05-27 RX ORDER — VARENICLINE TARTRATE 0.5 MG/1
TABLET, FILM COATED ORAL
Qty: 52 TABLET | Refills: 0 | Status: SHIPPED | OUTPATIENT
Start: 2022-05-27

## 2022-06-01 DIAGNOSIS — B96.89 ACUTE BACTERIAL SINUSITIS: Primary | ICD-10-CM

## 2022-06-01 DIAGNOSIS — J01.90 ACUTE BACTERIAL SINUSITIS: Primary | ICD-10-CM

## 2022-06-01 RX ORDER — AMOXICILLIN AND CLAVULANATE POTASSIUM 875; 125 MG/1; MG/1
1 TABLET, FILM COATED ORAL 2 TIMES DAILY
Qty: 14 TABLET | Refills: 0 | Status: SHIPPED | OUTPATIENT
Start: 2022-06-01 | End: 2022-06-08

## 2022-06-07 DIAGNOSIS — G57.91 NERVE DAMAGE OF RIGHT FOOT: ICD-10-CM

## 2022-06-07 DIAGNOSIS — F41.9 ANXIETY: ICD-10-CM

## 2022-06-07 DIAGNOSIS — I10 ESSENTIAL HYPERTENSION: ICD-10-CM

## 2022-06-07 RX ORDER — ENALAPRIL MALEATE AND HYDROCHLOROTHIAZIDE 5; 12.5 MG/1; MG/1
5-12.5 TABLET ORAL DAILY
Qty: 30 TABLET | Refills: 2 | OUTPATIENT
Start: 2022-06-07

## 2022-06-07 RX ORDER — BUSPIRONE HYDROCHLORIDE 15 MG/1
15 TABLET ORAL 2 TIMES DAILY
Qty: 60 TABLET | Refills: 2 | OUTPATIENT
Start: 2022-06-07

## 2022-06-07 RX ORDER — PREGABALIN 75 MG/1
75 CAPSULE ORAL 2 TIMES DAILY
Qty: 60 CAPSULE | Refills: 0 | Status: SHIPPED | OUTPATIENT
Start: 2022-06-07 | End: 2022-07-07 | Stop reason: SDUPTHER

## 2022-06-07 NOTE — TELEPHONE ENCOUNTER
Provider Department Appt Notes   6/9/2022 Renato Rocha, [address] [phone]  f/u knee pain, smoking cessation

## 2022-06-09 ENCOUNTER — TELEMEDICINE (OUTPATIENT)
Dept: FAMILY MEDICINE CLINIC | Age: 42
End: 2022-06-09
Payer: MEDICARE

## 2022-06-09 DIAGNOSIS — Z72.0 TOBACCO ABUSE DISORDER: Primary | ICD-10-CM

## 2022-06-09 DIAGNOSIS — G89.29 CHRONIC PAIN OF RIGHT KNEE: ICD-10-CM

## 2022-06-09 DIAGNOSIS — G57.91 NERVE DAMAGE OF RIGHT FOOT: ICD-10-CM

## 2022-06-09 DIAGNOSIS — M25.561 CHRONIC PAIN OF RIGHT KNEE: ICD-10-CM

## 2022-06-09 PROCEDURE — 99214 OFFICE O/P EST MOD 30 MIN: CPT | Performed by: STUDENT IN AN ORGANIZED HEALTH CARE EDUCATION/TRAINING PROGRAM

## 2022-06-09 PROCEDURE — G8427 DOCREV CUR MEDS BY ELIG CLIN: HCPCS | Performed by: STUDENT IN AN ORGANIZED HEALTH CARE EDUCATION/TRAINING PROGRAM

## 2022-06-09 PROCEDURE — G8417 CALC BMI ABV UP PARAM F/U: HCPCS | Performed by: STUDENT IN AN ORGANIZED HEALTH CARE EDUCATION/TRAINING PROGRAM

## 2022-06-09 PROCEDURE — 4004F PT TOBACCO SCREEN RCVD TLK: CPT | Performed by: STUDENT IN AN ORGANIZED HEALTH CARE EDUCATION/TRAINING PROGRAM

## 2022-06-09 ASSESSMENT — ENCOUNTER SYMPTOMS
COUGH: 0
SHORTNESS OF BREATH: 0
SORE THROAT: 0
ABDOMINAL PAIN: 0
VOMITING: 0
SINUS PRESSURE: 0

## 2022-06-09 NOTE — PROGRESS NOTES
2022    TELEHEALTH EVALUATION -- Audio/Visual (During OQZJO-66 public health emergency)    Due to COVID 19 outbreak, patient's office visit was converted to a virtual visit. Patient was contacted and agreed to proceed with a virtual visit via [employer] me  The risks and benefits of converting to a virtual visit were discussed in light of the current infectious disease epidemic. Patient also understood that insurance coverage and co-pays are up to their individual insurance plans. HPI:  Richar Conroy (:  1980) has requested an audio/video evaluation for the following concern(s):  Chief Complaint   Patient presents with    Follow-up    Knee Pain    Nicotine Dependence     Questions regarding the chantix      Right foot pain  History of \"nerve damage\" involving the right lower extremity and utilizes an ankle-foot orthosis on that side  Previously on gabapentin 800 mg 3 times daily  Also on Lyrica in the past     Started lyrica 75mg BID   Doing better on lyrica     Knee pain  Seen by Ortho  MRI showed synovitis and patient was referred to rheumatology  Patient would like second opinion  Was supposed to see Dr. Hamilton Coil 6/3  Missed her apt due to illness  She has to call the office to reschedule    Smoking cessation  Currently smoking 1 pack/day  Would like to decrease/quit smoking  She has never tried anything for smoking cessation in the past     Prescribed chantix and nicoderm  She has not started Chantix yet as she is some concerns for side effect  Was on Wellbutrin in the past however caused worsening depression    Review of Systems   Constitutional: Negative for chills and fever. HENT: Negative for congestion, sinus pressure and sore throat. Respiratory: Negative for cough and shortness of breath. Cardiovascular: Negative for chest pain and palpitations. Gastrointestinal: Negative for abdominal pain and vomiting. Musculoskeletal: Negative for arthralgias and myalgias.         Left knee pain, right foot pain   Skin: Negative for rash and wound. Neurological: Negative for speech difficulty and light-headedness. Psychiatric/Behavioral: Negative for suicidal ideas. The patient is not nervous/anxious. Prior to Visit Medications    Medication Sig Taking? Authorizing Provider   pregabalin (LYRICA) 75 MG capsule Take 1 capsule by mouth 2 times daily for 30 days.  Yes Windy Mcallister DO   varenicline (APO-VARENICLINE) 0.5 MG tablet Days 1 to 3: 0.5mg once a day, Days 4 to 7: 0.5 mg 2 times per day, Days 8 to end of treatment: 1 mg two times per day Yes Windy Mcallister DO   busPIRone (BUSPAR) 15 MG tablet Take 15 mg by mouth 2 times daily Yes Windy Mcallister DO   enalapril maleate-HCTZ 5-12.5 MG TABS Take 5-12.5 mg by mouth daily Yes Windy Mcallister DO   ibuprofen (ADVIL;MOTRIN) 800 MG tablet Take 1 tablet by mouth every 8 hours as needed for Pain Yes Windy Mcallister DO   nicotine (NICODERM CQ) 21 MG/24HR Place 1 patch onto the skin every 24 hours Yes Windy Mcallister DO   metFORMIN (GLUCOPHAGE-XR) 500 MG extended release tablet Take 2 tablets by mouth daily (with breakfast) Yes Windy Mcallister DO   Elastic Bandages & Supports (KNEE BRACE) MISC Use on the left knee daily for additional support Yes Windy Mcallister DO   ciclopirox (PENLAC) 8 % solution Apply topically nightly to nail and to 5mm of skin above nail plate Yes Windy Mcallister DO   buprenorphine-naloxone (SUBOXONE) 8-2 MG SUBL SL tablet  Yes Historical Provider, MD   albuterol sulfate  (90 Base) MCG/ACT inhaler Inhale 2 puffs into the lungs every 6 hours as needed for Wheezing or Shortness of Breath Yes Ashley Sánchez MD   fluticasone-vilanterol (BREO ELLIPTA) 100-25 MCG/INH AEPB inhaler Inhale 1 puff into the lungs daily  Windy Mcallister DO       Social History     Tobacco Use    Smoking status: Current Every Day Smoker     Packs/day: 1.00     Types: Cigarettes    Smokeless tobacco: Never Used   Vaping Use    Vaping Use: Never used   Substance Use Topics    Alcohol use: Not Currently    Drug use: Not Currently        Allergies   Allergen Reactions    Wellbutrin [Bupropion] Other (See Comments)     Depression   ,   Past Medical History:   Diagnosis Date    AC (acromioclavicular) joint bone spurs     spine    Anxiety     Depression     Endometriosis     Hypertension     Nerve damage of right foot     Pneumonia    ,   Past Surgical History:   Procedure Laterality Date     SECTION      DILATION AND CURETTAGE OF UTERUS      FOOT SURGERY Right     x2   ,   Social History     Tobacco Use    Smoking status: Current Every Day Smoker     Packs/day: 1.00     Types: Cigarettes    Smokeless tobacco: Never Used   Vaping Use    Vaping Use: Never used   Substance Use Topics    Alcohol use: Not Currently    Drug use: Not Currently   ,   Family History   Problem Relation Age of Onset    Heart Attack Father     Cancer Maternal Grandmother         pancreatic    Breast Cancer Paternal Grandmother     Cancer Paternal Grandmother         lung    Heart Attack Paternal Grandfather     Diabetes Neg Hx     Kidney Disease Neg Hx     Stroke Neg Hx    ,   Immunization History   Administered Date(s) Administered    COVID-19, Daisy Bender, Primary or Immunocompromised, PF, 100mcg/0.5mL 2020, 2021, 2021    Tdap (Boostrix, Adacel) 2011, 10/27/2017   ,   Health Maintenance   Topic Date Due    Pneumococcal 0-64 years Vaccine (1 - PCV) Never done    HIV screen  Never done    Hepatitis C screen  Never done    Cervical cancer screen  Never done    Diabetes screen  Never done    Lipids  Never done    Flu vaccine (Season Ended) 2022    Depression Monitoring  2023    DTaP/Tdap/Td vaccine (3 - Td or Tdap) 10/27/2027    COVID-19 Vaccine  Completed    Hepatitis A vaccine  Aged Out    Hepatitis B vaccine  Aged Out    Hib vaccine  Aged Out    Meningococcal (ACWY) vaccine  Aged Out       PHYSICAL an established patient. Services were provided through a video synchronous discussion virtually to substitute for in-person clinic visit.

## 2022-07-07 ENCOUNTER — TELEMEDICINE (OUTPATIENT)
Dept: FAMILY MEDICINE CLINIC | Age: 42
End: 2022-07-07
Payer: MEDICARE

## 2022-07-07 DIAGNOSIS — G89.29 CHRONIC PAIN OF RIGHT KNEE: Primary | ICD-10-CM

## 2022-07-07 DIAGNOSIS — M25.561 CHRONIC PAIN OF RIGHT KNEE: Primary | ICD-10-CM

## 2022-07-07 DIAGNOSIS — G57.91 NERVE DAMAGE OF RIGHT FOOT: ICD-10-CM

## 2022-07-07 DIAGNOSIS — F41.9 ANXIETY: ICD-10-CM

## 2022-07-07 DIAGNOSIS — Z72.0 TOBACCO ABUSE DISORDER: ICD-10-CM

## 2022-07-07 PROCEDURE — G8417 CALC BMI ABV UP PARAM F/U: HCPCS | Performed by: STUDENT IN AN ORGANIZED HEALTH CARE EDUCATION/TRAINING PROGRAM

## 2022-07-07 PROCEDURE — 4004F PT TOBACCO SCREEN RCVD TLK: CPT | Performed by: STUDENT IN AN ORGANIZED HEALTH CARE EDUCATION/TRAINING PROGRAM

## 2022-07-07 PROCEDURE — G8427 DOCREV CUR MEDS BY ELIG CLIN: HCPCS | Performed by: STUDENT IN AN ORGANIZED HEALTH CARE EDUCATION/TRAINING PROGRAM

## 2022-07-07 PROCEDURE — 99213 OFFICE O/P EST LOW 20 MIN: CPT | Performed by: STUDENT IN AN ORGANIZED HEALTH CARE EDUCATION/TRAINING PROGRAM

## 2022-07-07 RX ORDER — PREGABALIN 75 MG/1
75 CAPSULE ORAL 2 TIMES DAILY
Qty: 60 CAPSULE | Refills: 0 | Status: SHIPPED | OUTPATIENT
Start: 2022-07-07 | End: 2022-07-15 | Stop reason: SINTOL

## 2022-07-07 ASSESSMENT — ENCOUNTER SYMPTOMS
ABDOMINAL PAIN: 0
VOMITING: 0
COUGH: 0
SINUS PRESSURE: 0
SHORTNESS OF BREATH: 0
SORE THROAT: 0

## 2022-07-07 NOTE — PROGRESS NOTES
2022    TELEHEALTH EVALUATION -- Audio/Visual (During MQGSF-08 public health emergency)    Due to [place] 19 outbreak, patient's office visit was converted to a virtual visit. Patient was contacted and agreed to proceed with a virtual visit via [employer] me  The risks and benefits of converting to a virtual visit were discussed in light of the current infectious disease epidemic. Patient also understood that insurance coverage and co-pays are up to their individual insurance plans. HPI:  Kyra Castro (:  1980) has requested an audio/video evaluation for the following concern(s):  Chief Complaint   Patient presents with    1 Month Follow-Up    Medication Refill     Right foot pain  History of \"nerve damage\" involving the right lower extremity and utilizes an ankle-foot orthosis on that side  Previously on gabapentin 800 mg 3 times daily  Also on Lyrica in the past     Started lyrica 75mg BID   Doing better on lyrica     Knee pain  Seen by Ortho  MRI showed synovitis and patient was referred to rheumatology  Patient would like second opinion  Was supposed to see Dr. Rajendra Duong a few times but had to cancel both apts due to illness and transportation issues     Smoking cessation  Currently smoking 1 pack/day  Would like to decrease/quit smoking  She has never tried anything for smoking cessation in the past     Was on Wellbutrin in the past however caused worsening depression  Chantix caused headaches    Review of Systems   Constitutional: Negative for chills and fever. HENT: Negative for congestion, sinus pressure and sore throat. Respiratory: Negative for cough and shortness of breath. Cardiovascular: Negative for chest pain and palpitations. Gastrointestinal: Negative for abdominal pain and vomiting. Musculoskeletal: Negative for arthralgias and myalgias. Left knee pain, right foot pain   Skin: Negative for rash and wound.    Neurological: Negative for speech difficulty and light-headedness. Psychiatric/Behavioral: Negative for suicidal ideas. The patient is not nervous/anxious. Prior to Visit Medications    Medication Sig Taking? Authorizing Provider   pregabalin (LYRICA) 75 MG capsule Take 1 capsule by mouth 2 times daily for 30 days.  Yes Kathleen Byrd, DO   varenicline (APO-VARENICLINE) 0.5 MG tablet Days 1 to 3: 0.5mg once a day, Days 4 to 7: 0.5 mg 2 times per day, Days 8 to end of treatment: 1 mg two times per day Yes Kathleen Byrd, DO   busPIRone (BUSPAR) 15 MG tablet Take 15 mg by mouth 2 times daily Yes Kathleen Byrd, DO   enalapril maleate-HCTZ 5-12.5 MG TABS Take 5-12.5 mg by mouth daily Yes Kathleen Byrd, DO   ibuprofen (ADVIL;MOTRIN) 800 MG tablet Take 1 tablet by mouth every 8 hours as needed for Pain Yes Kathleen Byrd, DO   nicotine (NICODERM CQ) 21 MG/24HR Place 1 patch onto the skin every 24 hours Yes Kathleen Byrd, DO   metFORMIN (GLUCOPHAGE-XR) 500 MG extended release tablet Take 2 tablets by mouth daily (with breakfast) Yes Kathleen Byrd, DO   Elastic Bandages & Supports (KNEE BRACE) MISC Use on the left knee daily for additional support Yes Kathleen Byrd, DO   ciclopirox (PENLAC) 8 % solution Apply topically nightly to nail and to 5mm of skin above nail plate Yes Kathleen Byrd, DO   buprenorphine-naloxone (SUBOXONE) 8-2 MG SUBL SL tablet  Yes Historical Provider, MD   albuterol sulfate  (90 Base) MCG/ACT inhaler Inhale 2 puffs into the lungs every 6 hours as needed for Wheezing or Shortness of Breath Yes Dana Adamson MD   fluticasone-vilanterol (BREO ELLIPTA) 100-25 MCG/INH AEPB inhaler Inhale 1 puff into the lungs daily  Elsabino Byrd DO       Social History     Tobacco Use    Smoking status: Current Every Day Smoker     Packs/day: 1.00     Types: Cigarettes    Smokeless tobacco: Never Used   Vaping Use    Vaping Use: Never used   Substance Use Topics    Alcohol use: Not Currently    Drug use: Not Currently        Allergies Allergen Reactions    Wellbutrin [Bupropion] Other (See Comments)     Depression   ,   Past Medical History:   Diagnosis Date    AC (acromioclavicular) joint bone spurs     spine    Anxiety     Depression     Endometriosis     Hypertension     Nerve damage of right foot     Pneumonia    ,   Past Surgical History:   Procedure Laterality Date     SECTION      DILATION AND CURETTAGE OF UTERUS      FOOT SURGERY Right     x2   ,   Social History     Tobacco Use    Smoking status: Current Every Day Smoker     Packs/day: 1.00     Types: Cigarettes    Smokeless tobacco: Never Used   Vaping Use    Vaping Use: Never used   Substance Use Topics    Alcohol use: Not Currently    Drug use: Not Currently   ,   Family History   Problem Relation Age of Onset    Heart Attack Father     Cancer Maternal Grandmother         pancreatic    Breast Cancer Paternal Grandmother     Cancer Paternal Grandmother         lung    Heart Attack Paternal Grandfather     Diabetes Neg Hx     Kidney Disease Neg Hx     Stroke Neg Hx    ,   Immunization History   Administered Date(s) Administered    COVID-19, MODERNA BLUE border, Primary or Immunocompromised, (age 12y+), IM, 100 mcg/0.5mL 2020, 2021, 2021    Tdap (Boostrix, Adacel) 2011, 10/27/2017   ,   Health Maintenance   Topic Date Due    Pneumococcal 0-64 years Vaccine (1 - PCV) Never done    HIV screen  Never done    Hepatitis C screen  Never done    Cervical cancer screen  Never done    Diabetes screen  Never done    Lipids  Never done    Flu vaccine (1) 2022    Depression Monitoring  2023    DTaP/Tdap/Td vaccine (3 - Td or Tdap) 10/27/2027    COVID-19 Vaccine  Completed    Hepatitis A vaccine  Aged Out    Hepatitis B vaccine  Aged Out    Hib vaccine  Aged Out    Meningococcal (ACWY) vaccine  Aged Out       PHYSICAL EXAMINATION:  [ INSTRUCTIONS:  \"[x]\" Indicates a positive item  \"[]\" Indicates a negative item  -- DELETE ALL ITEMS NOT EXAMINED]  [x] Alert  [] Oriented to person/place/time    [x] No apparent distress  [] Toxic appearing    [] Face flushed appearing [x] Sclera clear  [] Lips are cyanotic      [x] Breathing appears normal  [] Appears tachypneic      [] Rash on visible skin    [x] Cranial Nerves II-XII grossly intact    [x] Motor grossly intact in visible upper extremities    [] Motor grossly intact in visible lower extremities    [x] Normal Mood  [] Anxious appearing    [] Depressed appearing  [] Confused appearing      [] Poor short term memory  [] Poor long term memory    [] OTHER:      Due to this being a TeleHealth encounter, evaluation of the following organ systems is limited: Vitals/Constitutional/EENT/Resp/CV/GI//MS/Neuro/Skin/Heme-Lymph-Imm. ASSESSMENT/PLAN:  1. Tobacco abuse disorder  Tried chantix but caused headaches  Had worsening depression with Wellbutrin   Tried to et nicoderm patches however they were going to be $65   Recommend pt call 8--QUIT-Now to see If they can get her free nicotine replacement products     2. Chronic pain of right knee  Patient missed her appoint with Dr. Camryn Green due to transportation issues  She still has to call the Ortho office to reschedule     3. Nerve damage of right foot  Doing well on Lyrica    Return in about 4 weeks (around 8/4/2022) for f/u chronic conditions. An  electronic signature was used to authenticate this note. --Kenny Borrego DO on 7/7/2022 at 2:37 PM        Pursuant to the emergency declaration under the [facility], 1135 waiver authority and the [employer] Act, this Virtual  Visit was conducted, with patient's consent, to reduce the patient's risk of exposure to COVID-19 and provide continuity of care for an established patient. Services were provided through a video synchronous discussion virtually to substitute for in-person clinic visit.

## 2022-07-15 DIAGNOSIS — G57.91 NERVE DAMAGE OF RIGHT FOOT: Primary | ICD-10-CM

## 2022-07-15 RX ORDER — GABAPENTIN 300 MG/1
CAPSULE ORAL
Qty: 90 CAPSULE | Refills: 0 | Status: SHIPPED | OUTPATIENT
Start: 2022-07-15 | End: 2022-07-26 | Stop reason: ALTCHOICE

## 2022-07-26 DIAGNOSIS — G57.91 NERVE DAMAGE OF RIGHT FOOT: Primary | ICD-10-CM

## 2022-07-26 RX ORDER — GABAPENTIN 800 MG/1
800 TABLET ORAL 3 TIMES DAILY
Qty: 90 TABLET | Refills: 3 | Status: SHIPPED | OUTPATIENT
Start: 2022-07-26 | End: 2022-08-19 | Stop reason: SDUPTHER

## 2022-07-27 ENCOUNTER — TELEPHONE (OUTPATIENT)
Dept: FAMILY MEDICINE CLINIC | Age: 42
End: 2022-07-27
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
## 2022-08-01 ENCOUNTER — TELEPHONE (OUTPATIENT)
Dept: FAMILY MEDICINE CLINIC | Age: 42
End: 2022-08-01

## 2022-08-01 NOTE — TELEPHONE ENCOUNTER
Octavio Bradshaw asked if we can have his soon to be ex wife in for a pill check. He thinks she is either abusing or selling her Gabapentin. He states she has been in rehab before and  in [place] used to prescribe but she was discharged as his patient.

## 2022-08-08 ENCOUNTER — PATIENT MESSAGE (OUTPATIENT)
Dept: FAMILY MEDICINE CLINIC | Age: 42
End: 2022-08-08
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
## 2022-08-19 ENCOUNTER — TELEMEDICINE (OUTPATIENT)
Dept: FAMILY MEDICINE CLINIC | Age: 42
End: 2022-08-19
Payer: MEDICARE

## 2022-08-19 DIAGNOSIS — M25.561 CHRONIC PAIN OF RIGHT KNEE: ICD-10-CM

## 2022-08-19 DIAGNOSIS — J42 CHRONIC BRONCHITIS, UNSPECIFIED CHRONIC BRONCHITIS TYPE (HCC): ICD-10-CM

## 2022-08-19 DIAGNOSIS — Z72.0 TOBACCO ABUSE DISORDER: ICD-10-CM

## 2022-08-19 DIAGNOSIS — G57.91 NERVE DAMAGE OF RIGHT FOOT: ICD-10-CM

## 2022-08-19 DIAGNOSIS — I10 ESSENTIAL HYPERTENSION: ICD-10-CM

## 2022-08-19 DIAGNOSIS — M79.89 LEFT LEG SWELLING: Primary | ICD-10-CM

## 2022-08-19 DIAGNOSIS — F41.9 ANXIETY: ICD-10-CM

## 2022-08-19 DIAGNOSIS — G89.29 CHRONIC PAIN OF RIGHT KNEE: ICD-10-CM

## 2022-08-19 PROCEDURE — 4004F PT TOBACCO SCREEN RCVD TLK: CPT | Performed by: STUDENT IN AN ORGANIZED HEALTH CARE EDUCATION/TRAINING PROGRAM

## 2022-08-19 PROCEDURE — 3023F SPIROM DOC REV: CPT | Performed by: STUDENT IN AN ORGANIZED HEALTH CARE EDUCATION/TRAINING PROGRAM

## 2022-08-19 PROCEDURE — G8427 DOCREV CUR MEDS BY ELIG CLIN: HCPCS | Performed by: STUDENT IN AN ORGANIZED HEALTH CARE EDUCATION/TRAINING PROGRAM

## 2022-08-19 PROCEDURE — 99214 OFFICE O/P EST MOD 30 MIN: CPT | Performed by: STUDENT IN AN ORGANIZED HEALTH CARE EDUCATION/TRAINING PROGRAM

## 2022-08-19 PROCEDURE — G8417 CALC BMI ABV UP PARAM F/U: HCPCS | Performed by: STUDENT IN AN ORGANIZED HEALTH CARE EDUCATION/TRAINING PROGRAM

## 2022-08-19 RX ORDER — BUSPIRONE HYDROCHLORIDE 15 MG/1
15 TABLET ORAL 2 TIMES DAILY
Qty: 60 TABLET | Refills: 2 | Status: SHIPPED | OUTPATIENT
Start: 2022-08-19

## 2022-08-19 RX ORDER — GABAPENTIN 800 MG/1
800 TABLET ORAL 3 TIMES DAILY
Qty: 90 TABLET | Refills: 0 | Status: SHIPPED | OUTPATIENT
Start: 2022-08-19 | End: 2022-09-18

## 2022-08-19 ASSESSMENT — ENCOUNTER SYMPTOMS
SHORTNESS OF BREATH: 0
COUGH: 0
ABDOMINAL PAIN: 0
SORE THROAT: 0
SINUS PRESSURE: 0
VOMITING: 0
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
## 2022-08-29 DIAGNOSIS — M79.89 LEFT LEG SWELLING: ICD-10-CM

## 2022-09-16 ENCOUNTER — TELEPHONE (OUTPATIENT)
Dept: FAMILY MEDICINE CLINIC | Age: 42
End: 2022-09-16